# Patient Record
Sex: FEMALE | Race: WHITE | Employment: OTHER | URBAN - METROPOLITAN AREA
[De-identification: names, ages, dates, MRNs, and addresses within clinical notes are randomized per-mention and may not be internally consistent; named-entity substitution may affect disease eponyms.]

---
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
## 2018-04-06 PROBLEM — I10 ESSENTIAL HYPERTENSION, BENIGN: Status: ACTIVE | Noted: 2018-04-06

## 2018-04-06 PROBLEM — D47.3 PRIMARY THROMBOCYTOSIS (HCC): Status: ACTIVE | Noted: 2018-04-06

## 2018-04-06 PROBLEM — R53.81 DEBILITY: Status: ACTIVE | Noted: 2018-04-06

## 2018-04-06 PROBLEM — R29.898 WEAKNESS OF BOTH LOWER EXTREMITIES: Status: ACTIVE | Noted: 2018-04-06
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
## 2019-05-29 ENCOUNTER — APPOINTMENT (OUTPATIENT)
Dept: MRI IMAGING | Age: 84
DRG: 069 | End: 2019-05-29
Payer: MEDICARE

## 2019-05-29 ENCOUNTER — APPOINTMENT (OUTPATIENT)
Dept: CT IMAGING | Age: 84
DRG: 069 | End: 2019-05-29
Payer: MEDICARE

## 2019-05-29 ENCOUNTER — HOSPITAL ENCOUNTER (INPATIENT)
Age: 84
LOS: 3 days | Discharge: NURSING, REHAB OR LONG-TERM CARE | DRG: 069 | End: 2019-06-01
Attending: EMERGENCY MEDICINE | Admitting: INTERNAL MEDICINE
Payer: MEDICARE

## 2019-05-29 ENCOUNTER — APPOINTMENT (OUTPATIENT)
Dept: GENERAL RADIOLOGY | Age: 84
DRG: 069 | End: 2019-05-29
Payer: MEDICARE

## 2019-05-29 DIAGNOSIS — I63.9 CEREBROVASCULAR ACCIDENT (CVA), UNSPECIFIED MECHANISM (HCC): ICD-10-CM

## 2019-05-29 DIAGNOSIS — I67.1 ANTERIOR COMMUNICATING ARTERY ANEURYSM: ICD-10-CM

## 2019-05-29 DIAGNOSIS — I66.21 OCCLUSION AND STENOSIS OF RIGHT POSTERIOR CEREBRAL ARTERY: Primary | ICD-10-CM

## 2019-05-29 DIAGNOSIS — R91.1 PULMONARY NODULE: ICD-10-CM

## 2019-05-29 DIAGNOSIS — R29.898 WEAKNESS OF BOTH LOWER EXTREMITIES: ICD-10-CM

## 2019-05-29 PROBLEM — R79.89 AZOTEMIA: Status: ACTIVE | Noted: 2019-05-29

## 2019-05-29 PROBLEM — I69.320 APHASIA S/P CVA: Status: ACTIVE | Noted: 2019-05-29

## 2019-05-29 LAB
A/G RATIO: 1.4 (ref 1.1–2.2)
ALBUMIN SERPL-MCNC: 4.3 G/DL (ref 3.4–5)
ALP BLD-CCNC: 46 U/L (ref 40–129)
ALT SERPL-CCNC: 10 U/L (ref 10–40)
ANION GAP SERPL CALCULATED.3IONS-SCNC: 12 MMOL/L (ref 3–16)
ANISOCYTOSIS: ABNORMAL
AST SERPL-CCNC: 26 U/L (ref 15–37)
BASOPHILS ABSOLUTE: 0 K/UL (ref 0–0.2)
BASOPHILS RELATIVE PERCENT: 0.6 %
BILIRUB SERPL-MCNC: 0.5 MG/DL (ref 0–1)
BILIRUBIN URINE: NEGATIVE
BLOOD, URINE: NEGATIVE
BUN BLDV-MCNC: 22 MG/DL (ref 7–20)
CALCIUM SERPL-MCNC: 9.4 MG/DL (ref 8.3–10.6)
CHLORIDE BLD-SCNC: 99 MMOL/L (ref 99–110)
CLARITY: CLEAR
CO2: 24 MMOL/L (ref 21–32)
COLOR: NORMAL
CREAT SERPL-MCNC: 1.1 MG/DL (ref 0.6–1.2)
EOSINOPHILS ABSOLUTE: 0 K/UL (ref 0–0.6)
EOSINOPHILS RELATIVE PERCENT: 0.3 %
GFR AFRICAN AMERICAN: 56
GFR NON-AFRICAN AMERICAN: 46
GLOBULIN: 3 G/DL
GLUCOSE BLD-MCNC: 118 MG/DL (ref 70–99)
GLUCOSE BLD-MCNC: 121 MG/DL (ref 70–99)
GLUCOSE URINE: NEGATIVE MG/DL
HCT VFR BLD CALC: 34.6 % (ref 36–48)
HEMATOLOGY PATH CONSULT: YES
HEMOGLOBIN: 11.7 G/DL (ref 12–16)
INR BLD: 1.06 (ref 0.86–1.14)
KETONES, URINE: NEGATIVE MG/DL
LACTIC ACID: 2.1 MMOL/L (ref 0.4–2)
LEUKOCYTE ESTERASE, URINE: NEGATIVE
LYMPHOCYTES ABSOLUTE: 2.1 K/UL (ref 1–5.1)
LYMPHOCYTES RELATIVE PERCENT: 28.7 %
MACROCYTES: ABNORMAL
MCH RBC QN AUTO: 39.7 PG (ref 26–34)
MCHC RBC AUTO-ENTMCNC: 33.7 G/DL (ref 31–36)
MCV RBC AUTO: 117.7 FL (ref 80–100)
MICROSCOPIC EXAMINATION: NORMAL
MONOCYTES ABSOLUTE: 0.4 K/UL (ref 0–1.3)
MONOCYTES RELATIVE PERCENT: 5.9 %
NEUTROPHILS ABSOLUTE: 4.7 K/UL (ref 1.7–7.7)
NEUTROPHILS RELATIVE PERCENT: 64.5 %
NITRITE, URINE: NEGATIVE
PDW BLD-RTO: 13.3 % (ref 12.4–15.4)
PERFORMED ON: ABNORMAL
PH UA: 6 (ref 5–8)
PLATELET # BLD: 267 K/UL (ref 135–450)
PLATELET SLIDE REVIEW: ADEQUATE
PMV BLD AUTO: 6.5 FL (ref 5–10.5)
POTASSIUM SERPL-SCNC: 4.9 MMOL/L (ref 3.5–5.1)
PRO-BNP: 362 PG/ML (ref 0–449)
PROTEIN UA: NEGATIVE MG/DL
PROTHROMBIN TIME: 12.1 SEC (ref 9.8–13)
RBC # BLD: 2.94 M/UL (ref 4–5.2)
SLIDE REVIEW: ABNORMAL
SODIUM BLD-SCNC: 135 MMOL/L (ref 136–145)
SPECIFIC GRAVITY UA: <=1.005 (ref 1–1.03)
TOTAL PROTEIN: 7.3 G/DL (ref 6.4–8.2)
TROPONIN: <0.01 NG/ML
URINE REFLEX TO CULTURE: NORMAL
URINE TYPE: NORMAL
UROBILINOGEN, URINE: 0.2 E.U./DL
WBC # BLD: 7.3 K/UL (ref 4–11)

## 2019-05-29 PROCEDURE — 80053 COMPREHEN METABOLIC PANEL: CPT

## 2019-05-29 PROCEDURE — 83880 ASSAY OF NATRIURETIC PEPTIDE: CPT

## 2019-05-29 PROCEDURE — 85025 COMPLETE CBC W/AUTO DIFF WBC: CPT

## 2019-05-29 PROCEDURE — 84484 ASSAY OF TROPONIN QUANT: CPT

## 2019-05-29 PROCEDURE — 6360000004 HC RX CONTRAST MEDICATION: Performed by: EMERGENCY MEDICINE

## 2019-05-29 PROCEDURE — 1200000000 HC SEMI PRIVATE

## 2019-05-29 PROCEDURE — 70498 CT ANGIOGRAPHY NECK: CPT

## 2019-05-29 PROCEDURE — 70450 CT HEAD/BRAIN W/O DYE: CPT

## 2019-05-29 PROCEDURE — 2580000003 HC RX 258: Performed by: INTERNAL MEDICINE

## 2019-05-29 PROCEDURE — 6360000002 HC RX W HCPCS: Performed by: INTERNAL MEDICINE

## 2019-05-29 PROCEDURE — 70496 CT ANGIOGRAPHY HEAD: CPT

## 2019-05-29 PROCEDURE — 6360000002 HC RX W HCPCS

## 2019-05-29 PROCEDURE — 81003 URINALYSIS AUTO W/O SCOPE: CPT

## 2019-05-29 PROCEDURE — 85610 PROTHROMBIN TIME: CPT

## 2019-05-29 PROCEDURE — 71045 X-RAY EXAM CHEST 1 VIEW: CPT

## 2019-05-29 PROCEDURE — 93005 ELECTROCARDIOGRAM TRACING: CPT | Performed by: EMERGENCY MEDICINE

## 2019-05-29 PROCEDURE — 6370000000 HC RX 637 (ALT 250 FOR IP): Performed by: NURSE PRACTITIONER

## 2019-05-29 PROCEDURE — 83605 ASSAY OF LACTIC ACID: CPT

## 2019-05-29 PROCEDURE — 99291 CRITICAL CARE FIRST HOUR: CPT

## 2019-05-29 PROCEDURE — 96374 THER/PROPH/DIAG INJ IV PUSH: CPT

## 2019-05-29 PROCEDURE — 6370000000 HC RX 637 (ALT 250 FOR IP): Performed by: INTERNAL MEDICINE

## 2019-05-29 PROCEDURE — 36415 COLL VENOUS BLD VENIPUNCTURE: CPT

## 2019-05-29 PROCEDURE — 70551 MRI BRAIN STEM W/O DYE: CPT

## 2019-05-29 RX ORDER — ASPIRIN 81 MG/1
81 TABLET ORAL DAILY
Status: DISCONTINUED | OUTPATIENT
Start: 2019-05-29 | End: 2019-06-01 | Stop reason: HOSPADM

## 2019-05-29 RX ORDER — SODIUM CHLORIDE 9 MG/ML
INJECTION, SOLUTION INTRAVENOUS CONTINUOUS
Status: DISPENSED | OUTPATIENT
Start: 2019-05-29 | End: 2019-05-30

## 2019-05-29 RX ORDER — HYDROXYUREA 500 MG/1
500 CAPSULE ORAL DAILY
Status: DISCONTINUED | OUTPATIENT
Start: 2019-05-29 | End: 2019-06-01 | Stop reason: HOSPADM

## 2019-05-29 RX ORDER — ACETAMINOPHEN 325 MG/1
650 TABLET ORAL EVERY 4 HOURS PRN
Status: DISCONTINUED | OUTPATIENT
Start: 2019-05-29 | End: 2019-05-29 | Stop reason: SDUPTHER

## 2019-05-29 RX ORDER — MULTIVITAMIN
1 TABLET ORAL
COMMUNITY

## 2019-05-29 RX ORDER — SIMVASTATIN 10 MG
20 TABLET ORAL NIGHTLY
Status: DISCONTINUED | OUTPATIENT
Start: 2019-05-29 | End: 2019-06-01 | Stop reason: HOSPADM

## 2019-05-29 RX ORDER — ONDANSETRON 2 MG/ML
4 INJECTION INTRAMUSCULAR; INTRAVENOUS EVERY 6 HOURS PRN
Status: DISCONTINUED | OUTPATIENT
Start: 2019-05-29 | End: 2019-06-01 | Stop reason: HOSPADM

## 2019-05-29 RX ORDER — ACETAMINOPHEN 500 MG
500 TABLET ORAL EVERY 6 HOURS PRN
Status: DISCONTINUED | OUTPATIENT
Start: 2019-05-29 | End: 2019-06-01 | Stop reason: HOSPADM

## 2019-05-29 RX ORDER — CHOLECALCIFEROL (VITAMIN D3) 125 MCG
5 CAPSULE ORAL NIGHTLY PRN
Status: DISCONTINUED | OUTPATIENT
Start: 2019-05-29 | End: 2019-06-01 | Stop reason: HOSPADM

## 2019-05-29 RX ORDER — METOPROLOL SUCCINATE 25 MG/1
25 TABLET, EXTENDED RELEASE ORAL DAILY
Status: DISCONTINUED | OUTPATIENT
Start: 2019-05-29 | End: 2019-06-01 | Stop reason: HOSPADM

## 2019-05-29 RX ORDER — ACETAMINOPHEN 500 MG
500 TABLET ORAL
COMMUNITY

## 2019-05-29 RX ORDER — LORAZEPAM 2 MG/ML
INJECTION INTRAMUSCULAR
Status: COMPLETED
Start: 2019-05-29 | End: 2019-05-29

## 2019-05-29 RX ORDER — DOXYCYCLINE HYCLATE 100 MG
100 TABLET ORAL 2 TIMES DAILY
Status: DISCONTINUED | OUTPATIENT
Start: 2019-05-29 | End: 2019-06-01 | Stop reason: HOSPADM

## 2019-05-29 RX ORDER — HYDROCODONE BITARTRATE AND ACETAMINOPHEN 5; 325 MG/1; MG/1
1 TABLET ORAL DAILY
Status: DISCONTINUED | OUTPATIENT
Start: 2019-05-29 | End: 2019-06-01 | Stop reason: HOSPADM

## 2019-05-29 RX ORDER — SODIUM CHLORIDE 0.9 % (FLUSH) 0.9 %
10 SYRINGE (ML) INJECTION EVERY 12 HOURS SCHEDULED
Status: DISCONTINUED | OUTPATIENT
Start: 2019-05-29 | End: 2019-06-01 | Stop reason: HOSPADM

## 2019-05-29 RX ORDER — AMLODIPINE BESYLATE 2.5 MG/1
2.5 TABLET ORAL NIGHTLY
Status: DISCONTINUED | OUTPATIENT
Start: 2019-05-29 | End: 2019-06-01 | Stop reason: HOSPADM

## 2019-05-29 RX ORDER — GABAPENTIN 100 MG/1
100 CAPSULE ORAL 2 TIMES DAILY
Status: DISCONTINUED | OUTPATIENT
Start: 2019-05-29 | End: 2019-06-01 | Stop reason: HOSPADM

## 2019-05-29 RX ORDER — GABAPENTIN 100 MG/1
CAPSULE ORAL
Status: ON HOLD | COMMUNITY
Start: 2019-03-26 | End: 2021-01-08 | Stop reason: HOSPADM

## 2019-05-29 RX ORDER — HYDROXYUREA 500 MG/1
CAPSULE ORAL
COMMUNITY
Start: 2019-05-17

## 2019-05-29 RX ORDER — PROPAFENONE HYDROCHLORIDE 225 MG/1
225 CAPSULE, EXTENDED RELEASE ORAL EVERY 8 HOURS
Status: DISCONTINUED | OUTPATIENT
Start: 2019-05-29 | End: 2019-06-01 | Stop reason: HOSPADM

## 2019-05-29 RX ORDER — DORZOLAMIDE HCL 20 MG/ML
1 SOLUTION/ DROPS OPHTHALMIC 3 TIMES DAILY
Status: DISCONTINUED | OUTPATIENT
Start: 2019-05-29 | End: 2019-06-01 | Stop reason: HOSPADM

## 2019-05-29 RX ORDER — SODIUM CHLORIDE 0.9 % (FLUSH) 0.9 %
10 SYRINGE (ML) INJECTION PRN
Status: DISCONTINUED | OUTPATIENT
Start: 2019-05-29 | End: 2019-06-01 | Stop reason: HOSPADM

## 2019-05-29 RX ORDER — LORAZEPAM 2 MG/ML
1 INJECTION INTRAMUSCULAR ONCE
Status: COMPLETED | OUTPATIENT
Start: 2019-05-29 | End: 2019-05-29

## 2019-05-29 RX ORDER — DORZOLAMIDE HCL 20 MG/ML
SOLUTION/ DROPS OPHTHALMIC
COMMUNITY

## 2019-05-29 RX ORDER — DOXYCYCLINE HYCLATE 100 MG
TABLET ORAL
Status: ON HOLD | COMMUNITY
Start: 2019-05-22 | End: 2019-06-01 | Stop reason: HOSPADM

## 2019-05-29 RX ADMIN — LORAZEPAM 1 MG: 2 INJECTION INTRAMUSCULAR at 12:32

## 2019-05-29 RX ADMIN — ASPIRIN 81 MG: 81 TABLET, COATED ORAL at 16:40

## 2019-05-29 RX ADMIN — IOPAMIDOL 75 ML: 755 INJECTION, SOLUTION INTRAVENOUS at 12:51

## 2019-05-29 RX ADMIN — SIMVASTATIN 20 MG: 10 TABLET, FILM COATED ORAL at 22:03

## 2019-05-29 RX ADMIN — MELATONIN TAB 5 MG 5 MG: 5 TAB at 22:03

## 2019-05-29 RX ADMIN — ENOXAPARIN SODIUM 30 MG: 30 INJECTION SUBCUTANEOUS at 16:39

## 2019-05-29 RX ADMIN — LORAZEPAM 1 MG: 2 INJECTION INTRAMUSCULAR; INTRAVENOUS at 12:32

## 2019-05-29 RX ADMIN — SODIUM CHLORIDE: 9 INJECTION, SOLUTION INTRAVENOUS at 16:40

## 2019-05-29 ASSESSMENT — PAIN SCALES - GENERAL: PAINLEVEL_OUTOF10: 0

## 2019-05-29 NOTE — ED NOTES
Pt arrival 1229  ED  assess & ordered  stroke team @0399  CT notified of ED CODE STROKE 1230   stroke DrErenUNKNOWN (Watchung did not answer call, Joao cannot remember who he spoke to)         Jaclyn German  05/29/19 [id number]

## 2019-05-29 NOTE — ED NOTES
Bed: 01  Expected date: 5/29/19  Expected time: 12:16 PM  Means of arrival: [facility]  Comments:  81yo female AMS, general weakness, , \"trouble getting words out\" per squad report NIHSS negative      Sylwia Payne, KENDRICK  05/29/19 [id number]

## 2019-05-29 NOTE — ED PROVIDER NOTES
[facility] Emergency Department    CHIEF COMPLAINT  Speech Problem (Pt having trouble \"getting words out\" per EMS; unknown LKW; tremors of arms. Dr. Andrea Dies to triage for assessment upon pt arrival )      HISTORY OF PRESENT ILLNESS  Ruel Arizmendi is a 80 y.o. female presenting to the ER with some difficulty speaking. Much of the history was obtained initially from the paramedics and also from the patient and further additional information was obtained from the daughter who arrived later. Patient lives a lone. She was last seen by family may be around 8 PM last night and seemed to be acting normally. The daughter was unable to get a hold of her mother by phone this morning and went over to the patient's home and found her sitting in a chair staring blankly ahead. The patient was having difficulty getting words out. On arrival, patient is having some difficulty getting words out and stuttering. Nothing in particular seems to make symptoms better or worse. No other complaints, modifying factors or associated symptoms. I have reviewed the following from the nursing documentation.     Past Medical History:   Diagnosis Date    Atrial fibrillation ([address]     Colon polyps 5/05    Depression     Diabetes mellitus ([address]     Diverticula of colon 5/05    Elbow injury 12/10    fell -injured lt elbow    Esophageal reflux     Glaucoma     Hearing loss     hard of hearing - bilateral hearing aides    Hyperlipidemia     Insomnia     Osteoporosis     PAT (paroxysmal atrial tachycardia) (HCC)     Rhinitis, allergic     Wears glasses      Past Surgical History:   Procedure Laterality Date    APPENDECTOMY      CATARACT REMOVAL WITH IMPLANT  1/17/11    right eye    CATARACT REMOVAL WITH IMPLANT  t    cataract removal with lens implant left eye    CHOLECYSTECTOMY      COLONOSCOPY      HYSTERECTOMY      TONSILLECTOMY      age 15     Family History   Problem Relation Age of Onset    Stroke Father     Diabetes Brother     Diabetes Maternal Aunt      Social History     Socioeconomic History    Marital status:       Spouse name: Not on file    Number of children: Not on file    Years of education: Not on file    Highest education level: Not on file   Occupational History    Not on file   Social Needs    Financial resource strain: Not on file    Food insecurity:     Worry: Not on file     Inability: Not on file    Transportation needs:     Medical: Not on file     Non-medical: Not on file   Tobacco Use    Smoking status: Former Smoker     Last attempt to quit: 1975     Years since quittin.4    Smokeless tobacco: Never Used   Substance and Sexual Activity    Alcohol use: Yes     Comment: occasionally    Drug use: No    Sexual activity: Not on file   Lifestyle    Physical activity:     Days per week: Not on file     Minutes per session: Not on file    Stress: Not on file   Relationships    Social connections:     Talks on phone: Not on file     Gets together: Not on file     Attends [employer] service: Not on file     Active member of club or organization: Not on file     Attends meetings of clubs or organizations: Not on file     Relationship status: Not on file    Intimate partner violence:     Fear of current or ex partner: Not on file     Emotionally abused: Not on file     Physically abused: Not on file     Forced sexual activity: Not on file   Other Topics Concern    Not on file   Social History Narrative    Not on file     Current Facility-Administered Medications   Medication Dose Route Frequency Provider Last Rate Last Dose    sodium chloride flush 0.9 % injection 10 mL  10 mL Intravenous 2 times per day Moisés Malik MD        sodium chloride flush 0.9 % injection 10 mL  10 mL Intravenous PRN Moisés Malik MD        magnesium hydroxide (MILK OF MAGNESIA) 400 MG/5ML suspension 30 mL  30 mL Oral Daily PRN Moisés Malik MD        ondansetron [facility] injection 4 mg  4 mg Intravenous Q6H PRN Syd Bowser MD        enoxaparin (LOVENOX) injection 30 mg  30 mg Subcutaneous Daily Syd Bowser MD        acetaminophen (TYLENOL) tablet 500 mg  500 mg Oral Q6H PRN Syd Bowser MD        amLODIPine (NORVASC) tablet 2.5 mg  2.5 mg Oral Nightly Syd Bowser MD        aspirin EC tablet 81 mg  81 mg Oral Daily Syd Bowser MD        dorzolamide (TRUSOPT) 2 % ophthalmic solution 1 drop  1 drop Both Eyes TID Syd Bowser MD        doxycycline hyclate (VIBRA-TABS) tablet 100 mg  100 mg Oral BID Syd Bowser MD        gabapentin (NEURONTIN) capsule 100 mg  100 mg Oral BID Syd Bowser MD        HYDROcodone-acetaminophen (NORCO) 5-325 MG per tablet 1 tablet  1 tablet Oral Daily Syd Bowser MD        hydroxyurea (HYDREA) chemo capsule 500 mg  500 mg Oral Daily Syd Bowser MD        metoprolol succinate (TOPROL XL) extended release tablet 25 mg  25 mg Oral Daily Syd Bowser MD        propafenone [facility]) extended release capsule 225 mg  225 mg Oral Q8H Syd Bowser MD        simvastatin (ZOCOR) tablet 20 mg  20 mg Oral Nightly Syd Bowser MD        0.9 % sodium chloride infusion   Intravenous Continuous Syd Bowser MD         Allergies   Allergen Reactions    Actonel [Risedronate Sodium] Other (See Comments)     actonel causes GI upset     Amoxicillin Hives       REVIEW OF SYSTEMS  10 systems reviewed, pertinent positives per HPI otherwise noted to be negative. PHYSICAL EXAM  /60   Pulse 60   Temp 98 °F (36.7 °C) (Oral)   Resp 16   Ht 5' 5\" (1.651 m)   Wt 120 lb 3.2 oz (54.5 kg)   SpO2 98%   BMI 20.00 kg/m²   GENERAL APPEARANCE: Awake and alert. Cooperative  HEAD: Normocephalic. Atraumatic. EYES: PERRL. EOM's grossly intact. ENT: Mucous membranes are moist.   NECK: Supple. Non-tender  HEART: RRR. LUNGS: Respirations unlabored. CTAB. Good air exchange. Speaking comfortably in full sentences. ABDOMEN: Soft. Non-distended. Non-tender. No masses. No organomegaly. No guarding or rebound. BACK:  No midline Tenderness. EXTREMITIES: No peripheral edema. Moves all extremities equally. All extremities neurovascularly intact. SKIN: Warm and dry. No acute rashes. NEUROLOGICAL: Alert and oriented. CN's 2-12 intact. No gross facial drooping. Strength 5/5, sensation intact. 2 plus DTR's in lower extremity bilaterally. Gait normal.   PSYCHIATRIC: Normal mood and affect. LABS  I have reviewed all labs for this visit.    Results for orders placed or performed during the hospital encounter of 05/29/19   CBC Auto Differential   Result Value Ref Range    WBC 7.3 4.0 - 11.0 K/uL    RBC 2.94 (L) 4.00 - 5.20 M/uL    Hemoglobin 11.7 (L) 12.0 - 16.0 g/dL    Hematocrit 34.6 (L) 36.0 - 48.0 %    .7 (H) 80.0 - 100.0 fL    MCH 39.7 (H) 26.0 - 34.0 pg    MCHC 33.7 31.0 - 36.0 g/dL    RDW 13.3 12.4 - 15.4 %    Platelets 938 639 - 555 K/uL    MPV 6.5 5.0 - 10.5 fL    PLATELET SLIDE REVIEW Adequate     SLIDE REVIEW see below     Path Consult Yes     Neutrophils % 64.5 %    Lymphocytes % 28.7 %    Monocytes % 5.9 %    Eosinophils % 0.3 %    Basophils % 0.6 %    Neutrophils # 4.7 1.7 - 7.7 K/uL    Lymphocytes # 2.1 1.0 - 5.1 K/uL    Monocytes # 0.4 0.0 - 1.3 K/uL    Eosinophils # 0.0 0.0 - 0.6 K/uL    Basophils # 0.0 0.0 - 0.2 K/uL    Anisocytosis 3+ (A)     Macrocytes 3+ (A)    Comprehensive Metabolic Panel   Result Value Ref Range    Sodium 135 (L) 136 - 145 mmol/L    Potassium 4.9 3.5 - 5.1 mmol/L    Chloride 99 99 - 110 mmol/L    CO2 24 21 - 32 mmol/L    Anion Gap 12 3 - 16    Glucose 121 (H) 70 - 99 mg/dL    BUN 22 (H) 7 - 20 mg/dL    CREATININE 1.1 0.6 - 1.2 mg/dL    GFR Non- 46 (A) >60    GFR  56 (A) >60    Calcium 9.4 8.3 - 10.6 mg/dL    Total Protein 7.3 6.4 - 8.2 g/dL    Alb 4.3 3.4 - 5.0 g/dL    Albumin/Globulin Ratio 1.4 1.1 - 2.2    Total Bilirubin 0.5 0.0 - 1.0 mg/dL disease involving the bilateral carotid bifurcation. No flow-limiting stenosis by NASCET criteria. No dissection or arterial injury is seen. VERTEBRAL ARTERIES: Scattered atherosclerotic calcification. Otherwise, the vertebral arteries both arise from the subclavian arteries and are normal in caliber without evidence of flow limiting stenosis or dissection. SOFT TISSUES: There is a 7 mm nodular pulmonary opacity within the right lung apex. This may represent a pulmonary nodule or area of scarring. No acute abnormality within the upper chest. There are multiple thyroid nodules bilaterally, with the largest thyroid nodule measuring 18 mm on the left. Please see recommendations below. The nasopharynx, oropharynx, hypopharynx, and laryngeal structures are unremarkable. The parotid glands and submandibular glands are unremarkable. No evidence of significant cervical or supraclavicular lymphadenopathy. BONES: Multilevel degenerative changes of the cervical spine, with multiple levels of grade 1 anterolisthesis. These are likely degenerative. Moderate compression deformity of T6 and T7 vertebral bodies. This appears chronic. Correlate with recent history of trauma. CTA HEAD: ANTERIOR CIRCULATION: Atherosclerotic disease is present at the bilateral internal carotid arteries intracranially. No flow-limiting stenosis. The anterior cerebral and middle cerebral arteries demonstrate no focal stenosis. There is a 2 mm aneurysm arising from the junction of the right A1 and A2 segments projecting posteromedially. POSTERIOR CIRCULATION: Moderate stenosis at the junction of the P1 and P2 segments of the right posterior cerebral artery. The left posterior cerebral artery appears unremarkable. The basilar artery and distal vertebral arteries are unremarkable. No high-grade stenosis or focal occlusion involving the intracranial or cervical vasculature. No evidence of acute dissection.  Moderate stenosis of the right posterior cerebral artery at the junction of P1 and P2 segments of the right posterior cerebral artery. Anterior communicating artery aneurysm arising from the junction of the right A1 and A2 segments measuring 2 mm. No subarachnoid hemorrhage was identified on CT examination. Chronic appearing moderate compression deformity of T6 and T7 vertebral bodies. Correlate with recent history of trauma and concern for traumatic findings. Nonspecific 8 mm pulmonary opacity within the right lung apex. Although this may represent area of scarring, pulmonary nodule is not excluded. Follow-up chest CT may be obtained as clinically warranted in this age group. Multiple thyroid nodules measuring 17 mm. Please see recommendations below. RECOMMENDATIONS: Managing Incidental Thyroid Nodule Detected at CT or MRI or US 1. Further evaluation by thyroid Ultrasound recommended for these incidental nodules: Patient Age 25 years or less - Nodule of any size Patient Age 21-27 years old - Nodule 1 cm in size or greater Patient Age 28 years or more - Nodule 1.5 cm in size or greater 2. Follow up thyroid ultrasound also recommend in these scenarios - Solitary nodule with high risk imaging features (locally invasive nodule or suspicious lymph nodes) - Heterogeneous, enlarged thyroid gland. - Increased uptake on PET 3. No further imaging is recommended in the following scenarios - Any nodule not meeting above criteria. - Those patients with limited life expectancy or significant Co-morbidities. Note: These recommendations do not apply to pts. w/ increased risk for thyroid cancer or pts. with symptomatic thyroid disease. ________________________________________________________________ Recommendations for f/u of Incidental Thyroid Nodules (ITN) found on CT, MR, NM and Extrathyroidal US are based upon the ACR white paper and [facility] 3-tiered system for managing ITNs: J Am Liliana Radiol.  2015 Feb;12(2): 143-50       ED COURSE/MDM  Patient seen and evaluated. Old records reviewed. Labs and imaging reviewed and results discussed with patient. This is a 70-year-old female presenting with aphasia and dysarthria. Patient has no other neurological findings on my examination. She was last seen normal around 8 PM last night. We do not have an exact time of onset. Patient lives alone. She is not a great historian. I did call a code stroke in I did speak with the stroke physician and he is not recommending TPA at this time. Patient was given an aspirin and admitted for further management. She has been stable throughout emergency department stay without any worsening of symptoms. Her CTA does show stenosis of the right posterior cerebral artery and also what is likely an incidental anterior communicating artery aneurysm. Due to the immediate potential for life-threatening deterioration due too stroke workup which did fine stenosis of posterior cerebral artery and anterior communicating artery aneurysm, I spent 65 minutes providing critical care. This time is excluding time spent performing procedures. CLINICAL IMPRESSION  1. 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 Cerebrovascular accident (CVA), unspecified mechanism ([address]    3. Pulmonary nodule    4. Anterior communicating artery aneurysm        Blood pressure 120/60, pulse 60, temperature 98 °F (36.7 °C), temperature source Oral, resp. rate 16, height 5' 5\" (1.651 m), weight 120 lb 3.2 oz (54.5 kg), SpO2 98 %. Elodia Morales was admitted in fair condition.          Hayder Crow DO  05/29/19 0405

## 2019-05-29 NOTE — H&P
Hospital Medicine History & Physical      PCP: Sha Bustillos MD    Date of Admission: 5/29/2019    Date of Service: Pt seen/examined on 30 May and Admitted to Inpatient with expected LOS greater than two midnights due to medical therapy. Chief Complaint:  Aphasia      History Of Present Illness:     80 y.o. female w/ hx Afib not on anticoagulation other than ASA due to fall risk who presented to [facility] with an episode of what seems like expressive aphasia and mental status changes just PTA 29 May, last seen normal PM 28 May. She denied any other antecedent sxs or other focal neuro deficits. The patient denies any fever/chills or other signs/sxs of systemic illness or identifiable aggravating/alleviating factors. She has maintained her appetite and denies urinary c/o. Per daughter present at bedside during eval she is near baseline. Past Medical History:          Diagnosis Date    Atrial fibrillation ([address]     Colon polyps 5/05    Depression     Diabetes mellitus ([facility] [address]     Diverticula of colon 5/05    Elbow injury 12/10    fell -injured lt elbow    Esophageal reflux     Glaucoma     Hearing loss     hard of hearing - bilateral hearing aides    Hyperlipidemia     Insomnia     Osteoporosis     PAT (paroxysmal atrial tachycardia) (HCC)     Rhinitis, allergic     Wears glasses        Past Surgical History:          Procedure Laterality Date    APPENDECTOMY      CATARACT REMOVAL WITH IMPLANT  1/17/11    right eye    CATARACT REMOVAL WITH IMPLANT  t    cataract removal with lens implant left eye    CHOLECYSTECTOMY      COLONOSCOPY      HYSTERECTOMY      TONSILLECTOMY      age 15       Medications Prior to Admission:      Prior to Admission medications    Medication Sig Start Date End Date Taking?  Authorizing Provider   dorzolamide (TRUSOPT) 2 % ophthalmic solution Apply to eye   Yes Historical Provider, MD   acetaminophen (TYLENOL) 500 MG tablet Take 500 mg by mouth   Yes Historical Provider, MD   doxycycline hyclate (VIBRA-TABS) 100 MG tablet  5/22/19  Yes Historical Provider, MD   gabapentin (NEURONTIN) 100 MG capsule  3/26/19  Yes Historical Provider, MD   hydroxyurea (HYDREA) 500 MG chemo capsule  5/17/19  Yes Historical Provider, MD   Multiple Vitamin (MULTIVITAMIN) tablet Take 1 tablet by mouth   Yes Historical Provider, MD   simvastatin (ZOCOR) 20 MG tablet Take 1 tablet by mouth nightly 4/13/18  Yes Sharon Hong MD   metoprolol succinate (TOPROL XL) 25 MG extended release tablet Take 1 tablet by mouth daily 4/14/18  Yes Sharon Hong MD   amLODIPine (NORVASC) 2.5 MG tablet Take 1 tablet by mouth nightly 4/13/18  Yes Blake Foreman MD   propafenone (RYTHMOL SR) 225 MG extended release capsule Take 1 capsule by mouth every 8 hours 4/13/18  Yes Blake Foreman MD   HYDROcodone-acetaminophen [facility]) 5-325 MG per tablet Take 1 tablet by mouth daily 1/6/16  Yes ASHLEY Cordoba MD   saline nasal gel (AYR) GEL by Nasal route as needed for Congestion   Yes Historical Provider, MD   timolol (TIMOPTIC-XE) 0.5 % ophthalmic gel-forming Place 1 drop into both eyes daily   Yes Historical Provider, MD   aspirin 81 MG EC tablet Take 81 mg by mouth daily. Yes Historical Provider, MD   gabapentin (NEURONTIN) 100 MG capsule Take 1 capsule by mouth 3 times daily for 122 days. . 4/13/18 8/13/18  Sharon Hong MD       Allergies:  Actonel [risedronate sodium] and Amoxicillin    Social History:      The patient currently lives independently, w/ family help and ambulatory w/ walker    TOBACCO:   reports that she quit smoking about 44 years ago. She has never used smokeless tobacco.  ETOH:   reports that she drinks alcohol. Family History:      Reviewed in detail and negative for CAD, Cancer.  Positive as follows:        Problem Relation Age of Onset    Stroke Father     Diabetes Brother     Diabetes Maternal Aunt        REVIEW OF SYSTEMS:   Pertinent positives as 04/05/2018    SPECGRAV <=1.005 04/05/2018    GLUCOSEU Negative 04/05/2018         ASSESSMENT:    Active Hospital Problems    Diagnosis Date Noted    Azotemia [R79.89] 05/29/2019    Aphasia S/P CVA [I69.320] 05/29/2019    Essential hypertension, benign [I10] 04/06/2018    Atrial fibrillation ([facility] [address] [I48.91] 09/12/2011       PLAN:      CVA - possible, w/ aphasia. CT head negative for acute process. Stroke team called from ED w/out recs for tPA. MRI w/ severe chronic small vessel changes but no evidence of acute infarct. ASA ordered. Neurology consulted and appreciated in advance. Neuro checks and lipid panel for AM    HTN - w/out known CAD and no evidence of active signs/sxs of ischemia/failure. Currently controlled on home meds w/ vitals reviewed and documented as above. Afib - rate controlled on Rythmol/BBlocker - continued. Not currently anticoagulated. Azotemia - w/ elevated BUN/Cr ratio c/w pre-renal azotemia. Given gentle IVF hydration and follow serial labs - stable. Reviewed and documented as above. DVT Prophylaxis: LMWH  Diet: No diet orders on file  Code Status: Prior      PT/OT Eval Status: ordered and pending. Dispo - pending Neurology recs and PT/OT eval.  Likely to home Thurs/Friday 30/31 May. Kenji Medellin MD    Thank you Marycruz Good MD for the opportunity to be involved in this patient's care. If you have any questions or concerns please feel free to contact me at [phone].

## 2019-05-30 LAB
ALBUMIN SERPL-MCNC: 3.8 G/DL (ref 3.4–5)
ANION GAP SERPL CALCULATED.3IONS-SCNC: 9 MMOL/L (ref 3–16)
BUN BLDV-MCNC: 24 MG/DL (ref 7–20)
CALCIUM SERPL-MCNC: 9.2 MG/DL (ref 8.3–10.6)
CHLORIDE BLD-SCNC: 107 MMOL/L (ref 99–110)
CHOLESTEROL, TOTAL: 134 MG/DL (ref 0–199)
CO2: 24 MMOL/L (ref 21–32)
CREAT SERPL-MCNC: 1.1 MG/DL (ref 0.6–1.2)
EKG ATRIAL RATE: 61 BPM
EKG DIAGNOSIS: NORMAL
EKG P AXIS: 84 DEGREES
EKG P-R INTERVAL: 284 MS
EKG Q-T INTERVAL: 500 MS
EKG QRS DURATION: 148 MS
EKG QTC CALCULATION (BAZETT): 503 MS
EKG R AXIS: -21 DEGREES
EKG T AXIS: 31 DEGREES
EKG VENTRICULAR RATE: 61 BPM
GFR AFRICAN AMERICAN: 56
GFR NON-AFRICAN AMERICAN: 46
GLUCOSE BLD-MCNC: 87 MG/DL (ref 70–99)
HCT VFR BLD CALC: 31.8 % (ref 36–48)
HDLC SERPL-MCNC: 71 MG/DL (ref 40–60)
HEMATOLOGY PATH CONSULT: NO
HEMOGLOBIN: 10.8 G/DL (ref 12–16)
LDL CHOLESTEROL CALCULATED: 52 MG/DL
MCH RBC QN AUTO: 40.1 PG (ref 26–34)
MCHC RBC AUTO-ENTMCNC: 34.1 G/DL (ref 31–36)
MCV RBC AUTO: 117.5 FL (ref 80–100)
PDW BLD-RTO: 13.2 % (ref 12.4–15.4)
PHOSPHORUS: 3.9 MG/DL (ref 2.5–4.9)
PLATELET # BLD: 244 K/UL (ref 135–450)
PMV BLD AUTO: 7.1 FL (ref 5–10.5)
POTASSIUM SERPL-SCNC: 4.6 MMOL/L (ref 3.5–5.1)
RBC # BLD: 2.7 M/UL (ref 4–5.2)
SODIUM BLD-SCNC: 140 MMOL/L (ref 136–145)
TRIGL SERPL-MCNC: 56 MG/DL (ref 0–150)
VLDLC SERPL CALC-MCNC: 11 MG/DL
WBC # BLD: 6 K/UL (ref 4–11)

## 2019-05-30 PROCEDURE — 97166 OT EVAL MOD COMPLEX 45 MIN: CPT

## 2019-05-30 PROCEDURE — 97161 PT EVAL LOW COMPLEX 20 MIN: CPT

## 2019-05-30 PROCEDURE — 6370000000 HC RX 637 (ALT 250 FOR IP): Performed by: INTERNAL MEDICINE

## 2019-05-30 PROCEDURE — 6360000002 HC RX W HCPCS: Performed by: INTERNAL MEDICINE

## 2019-05-30 PROCEDURE — 36415 COLL VENOUS BLD VENIPUNCTURE: CPT

## 2019-05-30 PROCEDURE — 6370000000 HC RX 637 (ALT 250 FOR IP): Performed by: NURSE PRACTITIONER

## 2019-05-30 PROCEDURE — 80061 LIPID PANEL: CPT

## 2019-05-30 PROCEDURE — 97530 THERAPEUTIC ACTIVITIES: CPT

## 2019-05-30 PROCEDURE — 80069 RENAL FUNCTION PANEL: CPT

## 2019-05-30 PROCEDURE — 99223 1ST HOSP IP/OBS HIGH 75: CPT | Performed by: PSYCHIATRY & NEUROLOGY

## 2019-05-30 PROCEDURE — 2580000003 HC RX 258: Performed by: INTERNAL MEDICINE

## 2019-05-30 PROCEDURE — 97535 SELF CARE MNGMENT TRAINING: CPT

## 2019-05-30 PROCEDURE — 1200000000 HC SEMI PRIVATE

## 2019-05-30 PROCEDURE — 93010 ELECTROCARDIOGRAM REPORT: CPT | Performed by: INTERNAL MEDICINE

## 2019-05-30 PROCEDURE — 97116 GAIT TRAINING THERAPY: CPT

## 2019-05-30 PROCEDURE — 85027 COMPLETE CBC AUTOMATED: CPT

## 2019-05-30 RX ORDER — GABAPENTIN 100 MG/1
100 CAPSULE ORAL ONCE
Status: COMPLETED | OUTPATIENT
Start: 2019-05-30 | End: 2019-05-30

## 2019-05-30 RX ORDER — GUAIFENESIN 600 MG/1
600 TABLET, EXTENDED RELEASE ORAL 2 TIMES DAILY
Status: DISCONTINUED | OUTPATIENT
Start: 2019-05-30 | End: 2019-06-01

## 2019-05-30 RX ORDER — AMLODIPINE BESYLATE 2.5 MG/1
2.5 TABLET ORAL ONCE
Status: COMPLETED | OUTPATIENT
Start: 2019-05-30 | End: 2019-05-30

## 2019-05-30 RX ADMIN — HYDROCODONE BITARTRATE AND ACETAMINOPHEN 1 TABLET: 5; 325 TABLET ORAL at 09:48

## 2019-05-30 RX ADMIN — MELATONIN TAB 5 MG 5 MG: 5 TAB at 21:00

## 2019-05-30 RX ADMIN — ENOXAPARIN SODIUM 30 MG: 30 INJECTION SUBCUTANEOUS at 09:48

## 2019-05-30 RX ADMIN — PROPAFENONE HYDROCHLORIDE 225 MG: 225 CAPSULE, EXTENDED RELEASE ORAL at 23:42

## 2019-05-30 RX ADMIN — DORZOLAMIDE HYDROCHLORIDE 1 DROP: 20 SOLUTION/ DROPS OPHTHALMIC at 21:52

## 2019-05-30 RX ADMIN — PROPAFENONE HYDROCHLORIDE 225 MG: 225 CAPSULE, EXTENDED RELEASE ORAL at 16:30

## 2019-05-30 RX ADMIN — GABAPENTIN 100 MG: 100 CAPSULE ORAL at 21:00

## 2019-05-30 RX ADMIN — PROPAFENONE HYDROCHLORIDE 225 MG: 225 CAPSULE, EXTENDED RELEASE ORAL at 00:38

## 2019-05-30 RX ADMIN — SODIUM CHLORIDE: 9 INJECTION, SOLUTION INTRAVENOUS at 06:49

## 2019-05-30 RX ADMIN — Medication 10 ML: at 21:00

## 2019-05-30 RX ADMIN — GABAPENTIN 100 MG: 100 CAPSULE ORAL at 10:44

## 2019-05-30 RX ADMIN — AMLODIPINE BESYLATE 2.5 MG: 2.5 TABLET ORAL at 21:00

## 2019-05-30 RX ADMIN — METOPROLOL SUCCINATE 25 MG: 25 TABLET, EXTENDED RELEASE ORAL at 09:48

## 2019-05-30 RX ADMIN — SIMVASTATIN 20 MG: 10 TABLET, FILM COATED ORAL at 21:00

## 2019-05-30 RX ADMIN — GUAIFENESIN 600 MG: 600 TABLET, EXTENDED RELEASE ORAL at 21:00

## 2019-05-30 RX ADMIN — PROPAFENONE HYDROCHLORIDE 225 MG: 225 CAPSULE, EXTENDED RELEASE ORAL at 09:47

## 2019-05-30 RX ADMIN — GUAIFENESIN 600 MG: 600 TABLET, EXTENDED RELEASE ORAL at 10:57

## 2019-05-30 RX ADMIN — HYDROXYUREA 500 MG: 500 CAPSULE ORAL at 09:47

## 2019-05-30 RX ADMIN — ASPIRIN 81 MG: 81 TABLET, COATED ORAL at 09:48

## 2019-05-30 ASSESSMENT — PAIN SCALES - GENERAL
PAINLEVEL_OUTOF10: 0
PAINLEVEL_OUTOF10: 5
PAINLEVEL_OUTOF10: 0

## 2019-05-30 NOTE — PROGRESS NOTES
Occupational Therapy   Occupational Therapy Initial Assessment/treatment   Date: 2019   Patient Name: Mo Miramontes  MRN: [id number]     : 1924    Date of Service: 2019    Discharge Recommendations:  Home with Home health OT, Home with assist PRN  OT Equipment Recommendations  Equipment Needed: No    Assessment      Pt presents requires cues for safety with functional transfers, mobility, and ADL tasks d/t decreased insight with   deficits and slight impairments to her vision. Mod cues for sequencing and navigating within her environment - total assist to manage IV line. Recommend HHOT at d/c. Cont with OT POC. 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 Diagnoses of Cerebrovascular accident (CVA), unspecified mechanism ([address] Pulmonary nodule, and Anterior communicating artery aneurysm were also pertinent to this visit. has a past medical history of Atrial fibrillation ([address] Colon polyps, Depression, Diabetes mellitus ([address] Diverticula of colon, Elbow injury, Esophageal reflux, Glaucoma, Hearing loss, Hyperlipidemia, Insomnia, Osteoporosis, PAT (paroxysmal atrial tachycardia) ([address] Rhinitis, allergic, and Wears glasses. has a past surgical history that includes Cholecystectomy; Colonoscopy; Hysterectomy; Tonsillectomy; Appendectomy; Cataract removal with implant (11); and Cataract removal with implant (t). Restrictions  Restrictions/Precautions  Restrictions/Precautions: General Precautions, Up as Tolerated, Fall Risk    Subjective   General  Chart Reviewed: Yes  Patient assessed for rehabilitation services?: Yes  Referring Practitioner: Jayna Zamudio MD  Diagnosis: aphasia s/p CVA  General Comment  Comments: RN approved therapy   Oxygen Therapy  SpO2: 98 %  O2 Device: None (Room air)  Social/Functional History  Social/Functional History  Lives With: Alone(daughter lives closeby; nephew lives down the street - could stay with her if needed)  Type of Home: (condo)  Home Layout: One level  Home Access: Level entry  Bathroom Shower/Tub: Walk-in shower  Bathroom Toilet: Standard  Bathroom Equipment: Shower chair, Grab bars [facility] [address]: [facility] bed(transport chair)  Receives Help From: Family  ADL Assistance: Needs assistance(aides supervise with bathing, assist with laundry and housekeeping)  [address]: Needs assistance(daughter does the grocery shopping; meals-on-wheels)  Ambulation Assistance: Independent(using 1CT)  Transfer Assistance: Independent  Active : No(daughter will transport )  Occupation: Retired  Leisure & Hobbies: use to [employer]; watch tv        Objective   Vision: Impaired  Vision Exceptions: Wears glasses at all times  Hearing: Exceptions to [facility]  Hearing Exceptions: Bilateral hearing aid;Hard of hearing/hearing concerns          Balance  Sitting Balance: Stand by assistance  Standing Balance: Contact guard assistance  Standing Balance  Time: hallway functional mobility with RW for 3 min, SBA/CGA  Functional Mobility  Functional - Mobility Device: (with RW )  Activity: To/from bathroom  Assist Level: Stand by assistance  ADL  Grooming: Stand by assistance(in stance level at sink side to wash hands )  LE Dressing: Stand by assistance(seated EOB to don/doff socks )  Toileting: Contact guard assistance        Bed mobility  Rolling to Right: Stand by assistance  Supine to Sit: Stand by assistance  Sit to Supine: Unable to assess(in chair end of tx )  Transfers  Sit to stand: Stand by assistance  Stand to sit: Stand by assistance     Cognition  Overall Cognitive Status: WFL        Sensation  Overall Sensation Status: WFL        LUE AROM (degrees)  LUE AROM : WFL  RUE AROM (degrees)  RUE AROM : WFL  LUE Strength  Gross LUE Strength: WFL  RUE Strength  Gross RUE Strength: WFL     Plan   Plan  Times per week: 3-5X/wk   Times per day: Daily  Current Treatment Recommendations: Strengthening, Endurance Training, Patient/Caregiver Education & Training, Positioning, Equipment Evaluation, Education, & procurement, Balance Training, Gait Training, Functional Mobility Training, Safety Education & Training, Self-Care / ADL      AM-PAC Score        AM-PAC Inpatient Daily Activity Raw Score: 20  AM-PAC Inpatient ADL T-Scale Score : 42.03  ADL Inpatient CMS 0-100% Score: 38.32  ADL Inpatient CMS G-Code Modifier : CJ    Goals  Short term goals  Time Frame for Short term goals: within one week while in acute care   Short term goal 1: pt will be SBA with functional transfers  Short term goal 2: pt will be SBA with toileting  Short term goal 3: pt will demonstrate equal or > fair+ dyn standing balance with activities - no LOB        Therapy Time   Individual Concurrent Group Co-treatment   Time In 0805         Time Out 0838         Minutes 33         Timed Code Treatment Minutes: Judy 3, OT

## 2019-05-30 NOTE — PROGRESS NOTES
Cataract removal with implant (1/17/11); and Cataract removal with implant (t). Restrictions  Restrictions/Precautions  Restrictions/Precautions: General Precautions, Up as Tolerated, Fall Risk  Position Activity Restriction  Other position/activity restrictions: High fall risk per nursing assessment  Vision/Hearing  Vision: Impaired  Vision Exceptions: Wears glasses at all times  Hearing: Exceptions to [facility]  Hearing Exceptions: Bilateral hearing aid;Hard of hearing/hearing concerns     Subjective  General  Chart Reviewed: Yes  Patient assessed for rehabilitation services?: Yes  Family / Caregiver Present: No  Referring Practitioner: Dr. Yolande Navarrete  Referral Date : 05/29/19  Diagnosis: Aphasia (r/o CVA)  Follows Commands: Impaired(delayed response time at times 2* [place])  General Comment  Comments: Pt resting in bed upon entry of therapy staff  Subjective  Subjective: Pt agreeable to work with PT/OT this morning, pleasant and cooperative.   Says she feels like she's getting \"back to normal.\"  Pain Screening  Patient Currently in Pain: Denies(at rest)  Intervention List: Patient able to continue with treatment    Orientation  Orientation  Overall Orientation Status: Within Functional Limits     Social/Functional History  Social/Functional History  Lives With: Alone(daughter lives closeby; nephew lives down the street - could stay with her if needed)  Type of Home: (condo)  Home Layout: One level  Home Access: Level entry  Bathroom Shower/Tub: Walk-in shower  Bathroom Toilet: Standard  Bathroom Equipment: Shower chair, Grab bars in [address] [place] [address]: [address] [facility] bed(transport chair)  Receives Help From: Family  ADL Assistance: Needs assistance(aides supervise with bathing, assist with laundry and housekeeping)  [address]: Needs assistance(daughter does the grocery shopping; meals-on-wheels)  Ambulation Assistance: Independent(using 5EP)  Transfer Assistance: Independent  Active : No(daughter will transport )  Occupation: Retired  Leisure & Hobbies: use to [employer]; watch tv     Objective  Observation/Palpation  Posture: Fair    RLE AROM: WFL  LLE AROM : WFL  Strength RLE: WFL  Strength LLE: WFL    Bed mobility  Rolling to Right: Stand by assistance  Supine to Sit: Stand by assistance(moving toward R side, using bedrail, increased time to complete)  Scooting: Stand by assistance(to scoot forward to EOB)     Transfers  Sit to Stand: Stand by assistance  Stand to sit: Stand by assistance  Bed to Chair: Stand by assistance(using RW)     Ambulation  Surface: level tile  Device: Rolling Walker  Assistance: Contact guard assistance;Stand by assistance  Quality of Gait: Pt amb with slow janes using RW for support. Fairly steady without LOB. Gait Deviations: Slow Janes;Decreased step length  Distance: x 80 feet    Balance  Posture: Fair  Sitting - Static: Good  Sitting - Dynamic: Good;-  Standing - Static: Good;-  Standing - Dynamic: Fair;+(when using RW)    Plan   Times per week: 3-5x/week in acute care  Times per day: Daily  Specific instructions for Next Treatment: Progress ther ex and mobility as tolerated  Current Treatment Recommendations: Strengthening, Gait Training, Balance Training, Functional Mobility Training, Endurance Training, Transfer Training, Safety Education & Training, Patient/Caregiver Education & Training  Safety Devices:  All fall risk precautions in place, Left in chair, Chair alarm in place, Nurse notified, Gait belt, Patient at risk for falls, Call light within reach    AM-PAC Score  [address] [address] [place] without Stair Climbing Raw Score : 18  AM-PAC Inpatient without Stair Climbing T-Scale Score : 51.97  Mobility Inpatient CMS 0-100% Score: 23.26  Mobility Inpatient without Stair CMS G-Code Modifier : CJ    Goals  Short term goals  Time Frame for Short term goals: 1 week, 6/06/19 (unless otherwise specified)  Short term goal 1: Pt will transfer supine <-> sit with

## 2019-05-30 NOTE — CONSULTS
In patient Neurology consult        [facility] Neurology      Emely Briggs MD      Boris Palafox  2/14/1924    Date of Service: 5/30/2019    Referring Physician: Kenji Medellin MD      Reason for the consult and CC: acute aphasia and new stroke. HPI:   The patient is a 80y.o.  years old female with hx of AF, HTN and DM who was admitted to [facility] yesterday with acute speech impairment and aphasia. Symptoms started 2 days ago. She describes sudden onset of speech and word finding difficulties. She was not following direction. Degree was severe. Duration was waxing and waning but persistent. No triggers. No other associated symptoms. No weakness, falling, headache, chest pain or visual disturbance. Patient came in to the hospital next day where she was admitted. Further workup with CT followed by MRI showed chronic small vessel disease and remote strokes. No acute strokes. CTA showed no severe stenosis and possible small ACOM aneurysm. Today she feels back to her baseline. History of A. fib on aspirin. Was not taking AC in the past due to concern of recurrent falling and bleeding. daughter reports weekly falling. Patient lives alone. No other new sx today and other ROS was negative.        Past Medical History:   Diagnosis Date    Atrial fibrillation ([address]     Colon polyps 5/05    Depression     Diabetes mellitus ([address]     Diverticula of colon 5/05    Elbow injury 12/10    fell -injured lt elbow    Esophageal reflux     Glaucoma     Hearing loss     hard of hearing - bilateral hearing aides    Hyperlipidemia     Insomnia     Osteoporosis     PAT (paroxysmal atrial tachycardia) (HCC)     Rhinitis, allergic     Wears glasses      Family History   Problem Relation Age of Onset    Stroke Father     Diabetes Brother     Diabetes Maternal Aunt      Past Surgical History:   Procedure Laterality Date    APPENDECTOMY      CATARACT REMOVAL WITH IMPLANT  1/17/11    right eye    CATARACT REMOVAL WITH IMPLANT  t    cataract removal with lens implant left eye    CHOLECYSTECTOMY      COLONOSCOPY      HYSTERECTOMY      TONSILLECTOMY      age 15     Past Surgical History:   Procedure Laterality Date    APPENDECTOMY      CATARACT REMOVAL WITH IMPLANT  11    right eye    CATARACT REMOVAL WITH IMPLANT  t    cataract removal with lens implant left eye    CHOLECYSTECTOMY      COLONOSCOPY      HYSTERECTOMY      TONSILLECTOMY      age 15     Family History   Problem Relation Age of Onset    Stroke Father     Diabetes Brother     Diabetes Maternal Aunt      Social History     Tobacco Use    Smoking status: Former Smoker     Last attempt to quit: 1975     Years since quittin.4    Smokeless tobacco: Never Used   Substance Use Topics    Alcohol use: Yes     Comment: occasionally    Drug use: No     Allergies   Allergen Reactions    Actonel [Risedronate Sodium] Other (See Comments)     actonel causes GI upset     Amoxicillin Hives     Current Facility-Administered Medications   Medication Dose Route Frequency Provider Last Rate Last Dose    guaiFENesin (MUCINEX) extended release tablet 600 mg  600 mg Oral BID Raúl Smith MD   600 mg at 19 1057    sodium chloride flush 0.9 % injection 10 mL  10 mL Intravenous 2 times per day Raúl Smith MD        sodium chloride flush 0.9 % injection 10 mL  10 mL Intravenous PRN Raúl Smith MD        magnesium hydroxide (MILK OF MAGNESIA) 400 MG/5ML suspension 30 mL  30 mL Oral Daily PRN Raúl Smith MD        ond[facility]etron [facility]) injection 4 mg  4 mg Intravenous Q6H PRN Raúl Smith MD        enoxaparin (LOVENOX) injection 30 mg  30 mg Subcutaneous Daily Raúl Smith MD   30 mg at 19 0948    acetaminophen (TYLENOL) tablet 500 mg  500 mg Oral Q6H PRN Raúl Smith MD        amLODIPine (NORVASC) tablet 2.5 mg  2.5 mg Oral Nightly Raúl Smith MD        aspirin EC tablet 81 mg  81 mg Oral Daily Jt Jones MD   81 mg at 05/30/19 0948    dorzolamide (TRUSOPT) 2 % ophthalmic solution 1 drop  1 drop Both Eyes TID Jt Jones MD        doxycycline hyclate (VIBRA-TABS) tablet 100 mg  100 mg Oral BID tJ Jones MD        gabapentin (NEURONTIN) capsule 100 mg  100 mg Oral BID Jt Jones MD   100 mg at 05/30/19 1044    HYDROcodone-acetaminophen (NORCO) 5-325 MG per tablet 1 tablet  1 tablet Oral Daily Jt Jones MD   1 tablet at 05/30/19 0948    hydroxyurea (HYDREA) chemo capsule 500 mg  500 mg Oral Daily Jt Jones MD   500 mg at 05/30/19 6620    metoprolol succinate (TOPROL XL) extended release tablet 25 mg  25 mg Oral Daily Jt Jones MD   25 mg at 05/30/19 0948    propafenone (RYTHMOL SR) extended release capsule 225 mg  225 mg Oral Q8H Jt Jones MD   225 mg at 05/30/19 5124    simvastatin (ZOCOR) tablet 20 mg  20 mg Oral Nightly Jt Jones MD   20 mg at 05/29/19 2203    melatonin tablet 5 mg  5 mg Oral Nightly PRN Michelle Kothari APRN - CNP   5 mg at 05/29/19 2203       ROS : A 10-12 system review of constitutional, cardiovascular, respiratory, musculoskeletal, endocrine, skin, hematological, SHEENT, genitourinary, psychiatric and neurologic systems was obtained and updated today and is unremarkable except as mentioned in my HPI      Exam:     Constitutional:   Vitals:    05/30/19 0038 05/30/19 0442 05/30/19 0803 05/30/19 1111   BP: 112/60 (!) 119/53 (!) 120/55 (!) 121/52   Pulse: 62 59 59 60   Resp: 18 16 16 16   Temp: 97.5 °F (36.4 °C) 97.8 °F (36.6 °C) 98 °F (36.7 °C) 97.8 °F (36.6 °C)   TempSrc: Oral Oral  Oral   SpO2: 96% 95% 94% 98%   Weight:       Height:           General appearance:  Normal development and appear in no acute distress. Eye: No icterus. Fundus: Funduscopic examination could not be performed due to patient's poor cooperation. Neck: supple  Cardiovascular: . No lower leg edema with good pulsation.    Mental Status: Oriented to person, place, problem, and time. Memory:  Poor immediate recall. Intact remote memory  Normal attention span and concentration. Language: intact naming, repeating and fluency   Poor fund of Knowledge. Aware of current events and vocabulary   Cranial Nerves:   II:   Pupils: equal, round, reactive to light  III,IV,VI: Extra Ocular Movements are intact. No nystagmus  V: Facial sensation is intact to pin prick and light touch  VII: Facial strength and movements: intact and symmetric  VIII: Hearing: Intact to finger rub bilaterally  IX: Palate elevation is symmetric  XI: Shoulder shrug is intact  XII: Tongue movements are normal  Musculoskeletal: 5/5 in all 4 extremities. Tone: Normal tone. Reflexes: Bilateral biceps 2/4, triceps 2/4, brachial radialis 2/4, knee 1/4 and ankle 1/4. Planters: flexor bilaterally. Coordination: no pronator drift, no dysmetria with FNF in upper extremities. Normal REM. Sensation: normal to all modalities in both arms and legs. Gait/Posture: unsteady gait. Data:  LABS:   Lab Results   Component Value Date     05/30/2019    K 4.6 05/30/2019    K 4.5 04/12/2018     05/30/2019    CO2 24 05/30/2019    BUN 24 05/30/2019    CREATININE 1.1 05/30/2019    GFRAA 56 05/30/2019    GFRAA >60 04/13/2013    LABGLOM 46 05/30/2019    GLUCOSE 87 05/30/2019    PHOS 3.9 05/30/2019    MG 1.8 02/14/2012    CALCIUM 9.2 05/30/2019     Lab Results   Component Value Date    WBC 6.0 05/30/2019    RBC 2.70 05/30/2019    HGB 10.8 05/30/2019    HCT 31.8 05/30/2019    .5 05/30/2019    RDW 13.2 05/30/2019     05/30/2019     Lab Results   Component Value Date    INR 1.06 05/29/2019    PROTIME 12.1 05/29/2019       Neuroimaging and/or  labs reviewed by me and discussed results with the patient/and family. Impression:  Acute aphasia and encephalopathy. Likely TIA. PAF  HTN  CAD  HLD  Chronic insomnia  Recurrent falling. 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 Asymptomatic. No need for further work up. Recommendation:  Long discussion with the patient and her daughter regarding stroke prevention and risk of recurrence. We discussed risk of strokes with A. radha and the risk of anticoagulation. The patient has significant history of recurrent falling in the past.  Family feels risk of bleeding is more than the benefit. Agree to continue with aspirin statin. High risk for falling and bleeding on AC. PT and OT  Echo   BS monitor  Continue current blood pressure medications and monitor blood pressure at home. Hydration  Telemetry  DVT and GI prophylaxis  Can continue melatonin for insomnia  DC planning when medically stable  DW family              Thank you for referring such patient. If you have any questions regarding my consult note, please don't hesitate to call me. Felipa Flores MD  [phone]    This dictation was generated by voice recognition computer software.  Although all attempts are made to edit the dictation for accuracy, there may be errors in the  transcription that are not intended

## 2019-05-30 NOTE — CARE COORDINATION
[facility]    Referral received from [address] [facility] RN CM with request for [place] [address] services. Writer has reviewed H&P. I will continue to follow patient for needs, and arrange [place] [address] services prior to DC. Should pt dc over the weekend please fax facesheet, order, KIKE, and H&P to [facility]. Update 12:45- Spoke with pt and spouse at bedside about [facility], all questions answered. Pt is agreeable to SN/PT/OT. Pt reports she has services through the [facility] and light duty housekeeping alternating services on Wednesdays. Pt aware agency will call to schedule SOC, pt and spouse would like [facility] to schedule services with their dtr, Brad. Pt is agreeable to having a SOC within 2 days of DC. Demographics verified. Pt and spouse report the pt's PCP is Dr. Desiree Plunkett, admitting updated. Orders will be faxed to [facility]. Should pt dc over the weekend please fax facesheet, order, KIKE, and H&P to [facility].      Anuja Renae RN CTN with [facility] [address]  QSK:[phone]

## 2019-05-30 NOTE — CARE COORDINATION
CASE MANAGEMENT INITIAL ASSESSMENT      Reviewed chart and met with patient today, re: rec's of home care   Explained Case Management role/services. Yes    Family present: nephew   Primary contact information: David Dowd    [facility] date/status: 5/29/19 Inpatient   Diagnosis: aphasia     Insurance: Medicare primary, C secondary   Precert required for SNF -  N        3 night stay required - Y    Living arrangements, Adls, care needs, prior to admission: lives alone in a condo with no steps     Transportation: family     Durable Medical Equipment at home: Walker_X_Cane__RTS__ BSC__Shower Chair__  02__ HHN__ CPAP__  BiPap__  Hospital Bed__ W/C___ Other_grab bars _________    Services in the home and/or outpatient, prior to admission: none    Dialysis Facility (if applicable)   · Name:  · Address:  · Dialysis Schedule:  · Phone:  · Fax:    PT/OT recs: home with home care     Hospital Exemption Notification (HEN): not initiated     Barriers to discharge: none    Plan/comments: Patient is mostly independent at home with the exception of driving which is provided by family. Discussed rec's of home care and she is agreeable with this plan. She has used [facility] in the past and would like to use them again. Placed call to [facility] with [facility] and notified of referral and she states they will follow at discharge.      ECOC on chart for MD signature

## 2019-05-30 NOTE — PLAN OF CARE
BP (!) 122/49   Pulse 60   Temp 97.8 °F (36.6 °C) (Oral)   Resp 18   Ht 5' 5\" (1.651 m)   Wt 120 lb 3.2 oz (54.5 kg)   SpO2 95%   BMI 20.00 kg/m²

## 2019-05-30 NOTE — DISCHARGE INSTR - COC
°F (36.6 °C) (Oral)   Resp 16   Ht 5' 5\" (1.651 m)   Wt 120 lb 3.2 oz (54.5 kg)   SpO2 98%   BMI 20.00 kg/m²     Last documented pain score (0-10 scale): Pain Level: 0  Last Weight:   Wt Readings from Last 1 Encounters:   05/29/19 120 lb 3.2 oz (54.5 kg)     Mental Status:  oriented and alert    IV Access:  - None    Nursing Mobility/ADLs:  Walking   Assisted  Transfer  Assisted  Bathing  Assisted  Dressing  Assisted  Toileting  Assisted  Feeding  Assisted  Med Admin  Assisted  Med Delivery   whole    Wound Care Documentation and Therapy:        Elimination:  Continence:   · Bowel: No  · Bladder: No  Urinary Catheter: None   Colostomy/Ileostomy/Ileal Conduit: No       Date of Last BM: ***  No intake or output data in the 24 hours ending 05/30/19 1209  No intake/output data recorded. Safety Concerns: At Risk for Falls    Impairments/Disabilities:      Hearing    Nutrition Therapy:  Current Nutrition Therapy:   - Oral Diet:  General    Routes of Feeding: Oral  Liquids: No Restrictions  Daily Fluid Restriction: no  Last Modified Barium Swallow with Video (Video Swallowing Test): not done    Treatments at the Time of Hospital Discharge:   Respiratory Treatments: ***  Oxygen Therapy:  is not on home oxygen therapy.   Ventilator:    - No ventilator support    Rehab Therapies: Physical Therapy and Occupational Therapy  Weight Bearing Status/Restrictions: No weight bearing restirctions  Other Medical Equipment (for information only, NOT a DME order):  walker  Other Treatments: ***    Patient's personal belongings (please select all that are sent with patient):  {[facility] DME Belongings:[id number]}    RN SIGNATURE:  Electronically signed by Skye Benitez RN on 6/1/19 at 6:24 PM    CASE MANAGEMENT/SOCIAL WORK SECTION    Inpatient Status Date: ***    Readmission Risk Assessment Score:  Readmission Risk              Risk of Unplanned Readmission:        15           Discharging to Facility/ Agency   · Name: [facility]  · Address:  · Phone: [phone]  · Fax:    Dialysis Facility (if applicable)   · Name:  · Address:  · Dialysis Schedule:  · Phone:  · Fax:    / signature: Electronically signed by Rahel Briseno RN on 6/1/19 at 2:15 PM    PHYSICIAN SECTION    Prognosis: Good    Condition at Discharge: Stable    Rehab Potential (if transferring to Rehab): Good    Recommended Labs or Other Treatments After Discharge: None  Recommended Follow-up, Labs or Other Treatments After Discharge:                   Physician Certification: I certify the above information and transfer of Darylene Foy  is necessary for the continuing treatment of the diagnosis listed and that she requires [place] for less 30 days.      Update Admission H&P: No change in H&P    PHYSICIAN SIGNATURE:  Electronically signed by Dhiraj Quick MD on 6/1/19 at 12:29 PM

## 2019-05-31 LAB — HEMATOLOGY PATH CONSULT: NORMAL

## 2019-05-31 PROCEDURE — 97535 SELF CARE MNGMENT TRAINING: CPT

## 2019-05-31 PROCEDURE — 6370000000 HC RX 637 (ALT 250 FOR IP): Performed by: NURSE PRACTITIONER

## 2019-05-31 PROCEDURE — 97530 THERAPEUTIC ACTIVITIES: CPT

## 2019-05-31 PROCEDURE — 6370000000 HC RX 637 (ALT 250 FOR IP): Performed by: INTERNAL MEDICINE

## 2019-05-31 PROCEDURE — 97110 THERAPEUTIC EXERCISES: CPT

## 2019-05-31 PROCEDURE — 99232 SBSQ HOSP IP/OBS MODERATE 35: CPT | Performed by: PSYCHIATRY & NEUROLOGY

## 2019-05-31 PROCEDURE — 97116 GAIT TRAINING THERAPY: CPT

## 2019-05-31 PROCEDURE — 6360000002 HC RX W HCPCS: Performed by: INTERNAL MEDICINE

## 2019-05-31 PROCEDURE — 2580000003 HC RX 258: Performed by: INTERNAL MEDICINE

## 2019-05-31 PROCEDURE — 1200000000 HC SEMI PRIVATE

## 2019-05-31 RX ADMIN — DORZOLAMIDE HYDROCHLORIDE 1 DROP: 20 SOLUTION/ DROPS OPHTHALMIC at 21:03

## 2019-05-31 RX ADMIN — ASPIRIN 81 MG: 81 TABLET, COATED ORAL at 08:34

## 2019-05-31 RX ADMIN — GABAPENTIN 100 MG: 100 CAPSULE ORAL at 08:35

## 2019-05-31 RX ADMIN — PROPAFENONE HYDROCHLORIDE 225 MG: 225 CAPSULE, EXTENDED RELEASE ORAL at 17:23

## 2019-05-31 RX ADMIN — GUAIFENESIN 600 MG: 600 TABLET, EXTENDED RELEASE ORAL at 21:04

## 2019-05-31 RX ADMIN — HYDROCODONE BITARTRATE AND ACETAMINOPHEN 1 TABLET: 5; 325 TABLET ORAL at 08:35

## 2019-05-31 RX ADMIN — METOPROLOL SUCCINATE 25 MG: 25 TABLET, EXTENDED RELEASE ORAL at 08:34

## 2019-05-31 RX ADMIN — GUAIFENESIN 600 MG: 600 TABLET, EXTENDED RELEASE ORAL at 08:34

## 2019-05-31 RX ADMIN — GABAPENTIN 100 MG: 100 CAPSULE ORAL at 21:03

## 2019-05-31 RX ADMIN — PROPAFENONE HYDROCHLORIDE 225 MG: 225 CAPSULE, EXTENDED RELEASE ORAL at 08:35

## 2019-05-31 RX ADMIN — DORZOLAMIDE HYDROCHLORIDE 1 DROP: 20 SOLUTION/ DROPS OPHTHALMIC at 14:29

## 2019-05-31 RX ADMIN — HYDROXYUREA 500 MG: 500 CAPSULE ORAL at 08:35

## 2019-05-31 RX ADMIN — Medication 10 ML: at 21:05

## 2019-05-31 RX ADMIN — ENOXAPARIN SODIUM 30 MG: 30 INJECTION SUBCUTANEOUS at 08:35

## 2019-05-31 RX ADMIN — DORZOLAMIDE HYDROCHLORIDE 1 DROP: 20 SOLUTION/ DROPS OPHTHALMIC at 08:44

## 2019-05-31 RX ADMIN — AMLODIPINE BESYLATE 2.5 MG: 2.5 TABLET ORAL at 21:03

## 2019-05-31 RX ADMIN — Medication 10 ML: at 08:35

## 2019-05-31 RX ADMIN — SIMVASTATIN 20 MG: 10 TABLET, FILM COATED ORAL at 21:04

## 2019-05-31 RX ADMIN — MELATONIN TAB 5 MG 5 MG: 5 TAB at 21:03

## 2019-05-31 ASSESSMENT — PAIN SCALES - GENERAL
PAINLEVEL_OUTOF10: 0
PAINLEVEL_OUTOF10: 0

## 2019-05-31 NOTE — PROGRESS NOTES
Jayson Renita  Neurology Follow-up  [facility]    Date of Service: 5/31/2019    Subjective:   CC: Follow up today regarding:  Acute aphasia. Possible TIA    Events noted. Chart and lab reviewed. The patient denies any new symptoms today. No headache, chest pain, speech impairment, weakness or numbness or tingling. No neck or back pain. Other review of system was unremarkable. ROS : A 10-12 system review obtained and updated today and is unremarkable except as mentioned  in my interval history.        Past Medical History:   Diagnosis Date    Atrial fibrillation ([address]     Colon polyps 5/05    Depression     Diabetes mellitus ([facility] [address]     Diverticula of colon 5/05    Elbow injury 12/10    fell -injured lt elbow    Esophageal reflux     Glaucoma     Hearing loss     hard of hearing - bilateral hearing aides    Hyperlipidemia     Insomnia     Osteoporosis     PAT (paroxysmal atrial tachycardia) (HCC)     Rhinitis, allergic     Wears glasses      Current Facility-Administered Medications   Medication Dose Route Frequency Provider Last Rate Last Dose    guaiFENesin (MUCINEX) extended release tablet 600 mg  600 mg Oral BID Enzo Clifton MD   600 mg at 05/31/19 1344    sodium chloride flush 0.9 % injection 10 mL  10 mL Intravenous 2 times per day Enzo Clifton MD   10 mL at 05/31/19 0835    sodium chloride flush 0.9 % injection 10 mL  10 mL Intravenous PRN Enzo Clifton MD        magnesium hydroxide (MILK OF MAGNESIA) 400 MG/5ML suspension 30 mL  30 mL Oral Daily PRN Enzo Clifton MD        ondansetron [facility]) injection 4 mg  4 mg Intravenous Q6H PRN Enzo Clifton MD        enoxaparin (LOVENOX) injection 30 mg  30 mg Subcutaneous Daily Enzo Clifton MD   30 mg at 05/31/19 4420    acetaminophen (TYLENOL) tablet 500 mg  500 mg Oral Q6H PRN Enzo Clifton MD        amLODIPine (NORVASC) tablet 2.5 mg  2.5 mg Oral Nightly Enzo Clifton MD        aspirin EC tablet 81 mg supple  Cardiovascular:  No lower leg edema with good pulsation. Mental Status:   Oriented to person, place, problem, and time. Memory: Good immediate recall. Intact remote memory  Normal attention span and concentration. Language: intact naming, repeating and fluency   Poor fund of Knowledge. Cranial Nerves:   II: Visual fields: Full. Pupils: equal, round, reactive to light  III,IV,VI: Extra Ocular Movements are intact. No nystagmus  V: Facial sensation is intact  VII: Facial strength and movements: intact and symmetric  IX: Palate elevation is symmetric  XI: Shoulder shrug is intact  XII: Tongue movements are normal  Musculoskeletal: 5/5 in all 4 extremities. Tone: Normal tone. Reflexes: Bilateral biceps 2/4, triceps 2/4, brachial radialis 2/4, knee 2/4 and ankle 1/4. Planters: flexor bilaterally. Coordination: no pronator drift, no dysmetria with FNF. Normal REM. Sensation: normal to all modalities in both arms and legs. Gait/Posture: steady gait        Data:  LABS:   Lab Results   Component Value Date     05/30/2019    K 4.6 05/30/2019    K 4.5 04/12/2018     05/30/2019    CO2 24 05/30/2019    BUN 24 05/30/2019    CREATININE 1.1 05/30/2019    GFRAA 56 05/30/2019    GFRAA >60 04/13/2013    LABGLOM 46 05/30/2019    GLUCOSE 87 05/30/2019    PHOS 3.9 05/30/2019    MG 1.8 02/14/2012    CALCIUM 9.2 05/30/2019     Lab Results   Component Value Date    WBC 6.0 05/30/2019    RBC 2.70 05/30/2019    HGB 10.8 05/30/2019    HCT 31.8 05/30/2019    .5 05/30/2019    RDW 13.2 05/30/2019     05/30/2019     Lab Results   Component Value Date    INR 1.06 05/29/2019    PROTIME 12.1 05/29/2019     Neuroimaging and/or  labs reviewed by me and discussed results with the patient. Impression:  Acute aphasia and encephalopathy. Likely TIA. The same  PAF  HTN  HLD  Chronic insomnia  Recurrent falling. 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 Asymptomatic. No need for further work up.    No

## 2019-05-31 NOTE — PROGRESS NOTES
Pt assessment completed and charted. VSS. Pt a/o. Pt [place] and has impaired vision. SB w/ walker. Pt denies pain. Bed in lowest position and wheels locked. Call light within reach. Bedside table within reach. Non-skid footwear in place. Pt denies any other needs at this time. Pt calls out appropriately. Will continue to monitor.

## 2019-05-31 NOTE — PROGRESS NOTES
Occupational Therapy  Facility/Department: [facility] B3 - MED SURG  Daily Treatment Note  NAME: Zack Vega  : 1924  MRN: [id number]    Date of Service: 2019    Discharge Recommendations:  Subacute/Skilled Nursing Facility     Assessment   Performance deficits / Impairments: Decreased functional mobility ; Decreased strength;Decreased endurance;Decreased ADL status; Decreased high-level IADLs;Decreased balance  Assessment: Pt would benefit from skilled OT services at SNF to address safe performance of ADLs and mobility. Pt demo's decreased standing balance during ADLs. Cont OT. 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 Diagnoses of Cerebrovascular accident (CVA), unspecified mechanism ([address] Pulmonary nodule, and Anterior communicating artery aneurysm were also pertinent to this visit. has a past medical history of Atrial fibrillation ([address] Colon polyps, Depression, Diabetes mellitus ([address] Diverticula of colon, Elbow injury, Esophageal reflux, Glaucoma, Hearing loss, Hyperlipidemia, Insomnia, Osteoporosis, PAT (paroxysmal atrial tachycardia) ([address] Rhinitis, allergic, and Wears glasses. has a past surgical history that includes Cholecystectomy; Colonoscopy; Hysterectomy; Tonsillectomy; Appendectomy; Cataract removal with implant (11); and Cataract removal with implant (t).     Restrictions  Restrictions/Precautions  Restrictions/Precautions: General Precautions, Up as Tolerated, Fall Risk  Position Activity Restriction  Other position/activity restrictions: High fall risk per nursing assessment  Subjective   General  Chart Reviewed: Yes  Patient assessed for rehabilitation services?: Yes  Family / Caregiver Present: Yes  Referring Practitioner: Matt Foreman MD  Diagnosis: aphasia s/p CVA  Subjective  Subjective: Pt agreeable to OT.    General Comment  Comments: RN approved therapy   Vital Signs  Patient Currently in Pain: No   Orientation  Orientation  Overall Orientation Status: Within Functional Limits  Objective    ADL  Grooming: Stand by assistance(wash hands at sink )  LE Dressing: Contact guard assistance  Toileting: Contact guard assistance     Balance  Sitting Balance: Supervision  Standing Balance: Contact guard assistance(RW)  Standing Balance  Time: x3 min   Functional Mobility  Functional - Mobility Device: Rolling Walker  Activity: To/from bathroom  Assist Level: Contact guard assistance  Toilet Transfers  Toilet - Technique: Ambulating(RW)  Equipment Used: Standard toilet  Toilet Transfer: Contact guard assistance  Bed mobility  Supine to Sit: Supervision  Sit to Supine: Supervision  Transfers  Stand Pivot Transfers: Contact guard assistance  Sit to stand: Contact guard assistance  Stand to sit: Contact guard assistance      Cognition  Overall Cognitive Status: WFL      Type of ROM/Therapeutic Exercise  Type of ROM/Therapeutic Exercise: AROM  Comment: seated  Exercises  Shoulder Elevation: 10x  Shoulder Flexion: 10x  Horizontal ABduction: 10x  Horizontal ADduction: 10x  Elbow Flexion: 10x  Elbow Extension: 10x  Grasp/Release: 10x      Plan   Plan  Times per week: 3-5X/wk   Times per day: Daily  Current Treatment Recommendations: Strengthening, Endurance Training, Patient/Caregiver Education & Training, Positioning, Equipment Evaluation, Education, & procurement, Balance Training, Gait Training, Functional Mobility Training, Safety Education & Training, Self-Care / ADL  AM-PAC Score   AM-PAC Inpatient Daily Activity Raw Score: 18  AM-PAC Inpatient ADL T-Scale Score : 38.66  ADL Inpatient CMS 0-100% Score: 46.65  ADL Inpatient CMS G-Code Modifier : CK  Goals  Short term goals  Time Frame for Short term goals: within one week while in acute care   Short term goal 1: pt will be SBA with functional transfers  Short term goal 2: pt will be SBA with toileting  Short term goal 3: pt will demonstrate equal or > fair+ dyn standing balance with activities - no LOB      Therapy Time   Individual Concurrent Group Co-treatment   Time In 1520         Time Out 1600         Minutes 40         Timed Code Treatment Minutes: Danielle 34 OTR/L

## 2019-05-31 NOTE — PROGRESS NOTES
Hospitalist Progress Note      PCP: Cyril Huang MD    Date of Admission: 5/29/2019    Chief Complaint: Aphasia    Subjective: no new c/o. Medications:  Reviewed    Infusion Medications   Scheduled Medications    guaiFENesin  600 mg Oral BID    sodium chloride flush  10 mL Intravenous 2 times per day    enoxaparin  30 mg Subcutaneous Daily    amLODIPine  2.5 mg Oral Nightly    aspirin  81 mg Oral Daily    dorzolamide  1 drop Both Eyes TID    doxycycline hyclate  100 mg Oral BID    gabapentin  100 mg Oral BID    HYDROcodone-acetaminophen  1 tablet Oral Daily    hydroxyurea  500 mg Oral Daily    metoprolol succinate  25 mg Oral Daily    propafenone  225 mg Oral Q8H    simvastatin  20 mg Oral Nightly     PRN Meds: sodium chloride flush, magnesium hydroxide, ondansetron, acetaminophen, melatonin      Intake/Output Summary (Last 24 hours) at 5/31/2019 0734  Last data filed at 5/31/2019 0602  Gross per 24 hour   Intake 0 ml   Output 0 ml   Net 0 ml       Physical Exam Performed:    /72   Pulse 62   Temp 98 °F (36.7 °C) (Oral)   Resp 16   Ht 5' 5\" (1.651 m)   Wt 120 lb 3.2 oz (54.5 kg)   SpO2 94%   BMI 20.00 kg/m²     General appearance: No apparent distress, appears stated age and cooperative. HEENT: Pupils equal, round, and reactive to light. Conjunctivae/corneas clear. Neck: Supple, with full range of motion. No jugular venous distention. Trachea midline. Respiratory:  Normal respiratory effort. Clear to auscultation, bilaterally without Rales/Wheezes/Rhonchi. Cardiovascular: Regular rate and rhythm with normal S1/S2 without murmurs, rubs or gallops. Abdomen: Soft, non-tender, non-distended with normal bowel sounds. Musculoskeletal: No clubbing, cyanosis or edema bilaterally. Full range of motion without deformity. Skin: Skin color, texture, turgor normal.  No rashes or lesions. Neurologic:  Neurovascularly intact without any focal sensory/motor deficits.  Cranial nerves: II-XII intact, grossly non-focal.  Psychiatric: Alert and oriented, thought content appropriate, normal insight  Capillary Refill: Brisk,< 3 seconds   Peripheral Pulses: +2 palpable, equal bilaterally       Labs:   Recent Labs     05/29/19  1236 05/30/19  0529   WBC 7.3 6.0   HGB 11.7* 10.8*   HCT 34.6* 31.8*    244     Recent Labs     05/29/19  1236 05/30/19  0529   * 140   K 4.9 4.6   CL 99 107   CO2 24 24   BUN 22* 24*   CREATININE 1.1 1.1   CALCIUM 9.4 9.2   PHOS  --  3.9     Recent Labs     05/29/19  1236   AST 26   ALT 10   BILITOT 0.5   ALKPHOS 46     Recent Labs     05/29/19  1236   INR 1.06     Recent Labs     05/29/19  1236   TROPONINI <0.01       Urinalysis:      Lab Results   Component Value Date    NITRU Negative 05/29/2019    BLOODU Negative 05/29/2019    SPECGRAV <=1.005 05/29/2019    GLUCOSEU Negative 05/29/2019       Radiology:  MRI BRAIN WO CONTRAST   Final Result   Moderate generalized involutional changes and moderate severe chronic small   vessel ischemic changes. No evidence of a acute infarct, midline shift or   mass effect. XR CHEST PORTABLE   Final Result   No active cardiopulmonary disease         CTA HEAD W CONTRAST   Preliminary Result   No high-grade stenosis or focal occlusion involving the intracranial or   cervical vasculature. No evidence of acute dissection. Moderate stenosis of the right posterior cerebral artery at the junction of   P1 and P2 segments of the right posterior cerebral artery. 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 No subarachnoid hemorrhage was identified   on CT examination. Chronic appearing moderate compression deformity of T6 and T7 vertebral   bodies. Correlate with recent history of trauma and concern for traumatic   findings. Nonspecific 8 mm pulmonary opacity within the right lung apex.   Although this   may represent area of scarring, pulmonary nodule is not excluded. Follow-up   chest CT may be obtained as clinically warranted in this age group. Multiple thyroid nodules measuring 17 mm. Please see recommendations below. RECOMMENDATIONS:   Managing Incidental Thyroid Nodule Detected at CT or MRI or US      1. Further evaluation by thyroid Ultrasound recommended for these incidental   nodules:      Patient Age 25 years or less - Nodule of any size      Patient Age 21-27 years old - Nodule 1 cm in size or greater      Patient Age 28 years or more - Nodule 1.5 cm in size or greater      2. Follow up thyroid ultrasound also recommend in these scenarios      - Solitary nodule with high risk imaging features (locally invasive nodule or   suspicious lymph nodes)      - Heterogeneous, enlarged thyroid gland.      - Increased uptake on PET      3. No further imaging is recommended in the following scenarios      - Any nodule not meeting above criteria. - Those patients with limited life expectancy or significant      Co-morbidities. Note: These recommendations do not apply to pts. w/ increased risk      for thyroid cancer or pts. with symptomatic thyroid disease.      ________________________________________________________________      Recommendations for f/u of Incidental Thyroid Nodules (ITN)      found on CT, MR, NM and Extrathyroidal US are based upon the ACR      white paper and [facility] 3-tiered system for managing ITNs:      J Am Liliana Radiol. 2015 Feb;12(2): 143-50         CTA NECK W CONTRAST   Preliminary Result   No high-grade stenosis or focal occlusion involving the intracranial or   cervical vasculature. No evidence of acute dissection. Moderate stenosis of the right posterior cerebral artery at the junction of   P1 and P2 segments of the right posterior cerebral artery. 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   No subarachnoid hemorrhage was identified   on CT examination. Chronic appearing moderate compression deformity of T6 and T7 vertebral   bodies. Correlate with recent history of trauma and concern for traumatic   findings. Nonspecific 8 mm pulmonary opacity within the right lung apex. Although this   may represent area of scarring, pulmonary nodule is not excluded. Follow-up   chest CT may be obtained as clinically warranted in this age group. Multiple thyroid nodules measuring 17 mm. Please see recommendations below. RECOMMENDATIONS:   Managing Incidental Thyroid Nodule Detected at CT or MRI or US      1. Further evaluation by thyroid Ultrasound recommended for these incidental   nodules:      Patient Age 25 years or less - Nodule of any size      Patient Age 21-27 years old - Nodule 1 cm in size or greater      Patient Age 28 years or more - Nodule 1.5 cm in size or greater      2. Follow up thyroid ultrasound also recommend in these scenarios      - Solitary nodule with high risk imaging features (locally invasive nodule or   suspicious lymph nodes)      - Heterogeneous, enlarged thyroid gland.      - Increased uptake on PET      3. No further imaging is recommended in the following scenarios      - Any nodule not meeting above criteria. - Those patients with limited life expectancy or significant      Co-morbidities. Note: These recommendations do not apply to pts. w/ increased risk      for thyroid cancer or pts. with symptomatic thyroid disease.      ________________________________________________________________      Recommendations for f/u of Incidental Thyroid Nodules (ITN)      found on CT, MR, NM and Extrathyroidal US are based upon the ACR      white paper and [facility] 3-tiered system for managing ITNs:      J Am Liliana Radiol. 2015 Feb;12(2): 143-50         CT Head WO Contrast   Final Result   No acute intracranial abnormality.       Diffuse atrophic changes with findings suggesting chronic

## 2019-05-31 NOTE — PROGRESS NOTES
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 Diagnoses of Cerebrovascular accident (CVA), unspecified mechanism ([address] Pulmonary nodule, and Anterior communicating artery aneurysm were also pertinent to this visit. has a past medical history of Atrial fibrillation ([address] Colon polyps, Depression, Diabetes mellitus ([address] Diverticula of colon, Elbow injury, Esophageal reflux, Glaucoma, Hearing loss, Hyperlipidemia, Insomnia, Osteoporosis, PAT (paroxysmal atrial tachycardia) ([address] Rhinitis, allergic, and Wears glasses. has a past surgical history that includes Cholecystectomy; Colonoscopy; Hysterectomy; Tonsillectomy; Appendectomy; Cataract removal with implant (11); and Cataract removal with implant (t). Restrictions  Restrictions/Precautions  Restrictions/Precautions: General Precautions, Up as Tolerated, Fall Risk  Position Activity Restriction  Other position/activity restrictions: High fall risk per nursing assessment  Subjective   General  Chart Reviewed: Yes  Response To Previous Treatment: Patient with no complaints from previous session. Family / Caregiver Present: No  Referring Practitioner: Dr. Arabella Monae  Subjective  Subjective: denies pain.    General Comment  Comments: RN cleared pt to participate   Pain Screening  Patient Currently in Pain: No  Vital Signs  Patient Currently in Pain: No       Orientation  Orientation  Overall Orientation Status: Within Functional Limits  Cognition      Objective   Bed mobility  Supine to Sit: Supervision  Sit to Supine: Supervision  Comment: with bed controls   Transfers  Sit to Stand: Stand by assistance  Stand to sit: Stand by assistance  Ambulation  Ambulation?: Yes  Ambulation 1  Surface: level tile  Device: Rolling Walker  Assistance: Contact guard assistance  Quality of Gait: Pt amb with slow janes using RW for support. Fairly steady without LOB. Gait Deviations: Slow Janes;Decreased step length  Distance: 120 ft      Balance  Sitting - Static: Good  Sitting - Dynamic: Good  Standing - Static: Good;-  Standing - Dynamic: Fair;+  Comments: static standing at RW with BUE support and SBA proivded by therapist x 2-3 min   Exercises  Hip Flexion: 2x10 BLE   Knee Long Arc Quad: 2x10 BLE   Ankle Pumps: 2x10 BLE   Comments: completed seated at EOB with no trunk support and BUE Support                         Assessment   Body structures, Functions, Activity limitations: Decreased functional mobility ; Decreased endurance;Decreased balance;Decreased strength  Assessment: pt tolerated session with no complaints. pt expressing concern about d/c home due to feeling like she has decline in functional mobility, endurnace, vision deficits. therapist educated pt about d/c plans of SNF, pt open to learning more about continued rehab. pt requires SBA for transfers with cues for safety, SBA for gait with RW, cues for safety due to vision deficits. At this time, recommend pt d/c SNF for conitnued skilled therapy as pt is functioning below baseline and does not have 24/7 supv available (which is required for safety 2* vision deficits).    Treatment Diagnosis: Decreased (I) with mobility  Specific instructions for Next Treatment: Progress ther ex and mobility as tolerated  Prognosis: Good  Patient Education: ther ex, d/c rec  REQUIRES PT FOLLOW UP: Yes  Activity Tolerance  Activity Tolerance: Patient Tolerated treatment well     G-Code     OutComes Score                                                  AM-PAC Score     AM-PAC Inpatient Mobility without Stair Climbing Raw Score : 18  AM-PAC Inpatient without Stair Climbing T-Scale Score : 51.97  Mobility Inpatient CMS 0-100% Score: 23.26  Mobility Inpatient without Stair CMS G-Code Modifier : CJ       Goals  Short term goals  Time Frame for Short term goals: 1 week, 6/06/19 (unless otherwise specified)  Short term goal 1: Pt will transfer supine <-> sit with modified(I)  Short term goal 2: Pt will transfer sit <-> stand and bed>chair using RW with modified(I)  Short term goal 3: Pt will ambulate x 150 feet using RW with supervision  Short term goal 4: By 6/01/19: Pt will tolerate 12-15 reps BLE exercise for strengthening, balance, and endurance- GOAL MET   Patient Goals   Patient goals : \"To go home\"    Plan    Plan  Times per week: 3-5x/week in acute care  Times per day: Daily  Specific instructions for Next Treatment: Progress ther ex and mobility as tolerated  Current Treatment Recommendations: Strengthening, Gait Training, Balance Training, Functional Mobility Training, Endurance Training, Transfer Training, Safety Education & Training, Patient/Caregiver Education & Training  Safety Devices  Type of devices:  All fall risk precautions in place, Nurse notified, Gait belt, Patient at risk for falls, Call light within reach, Left in bed, Bed alarm in place     Therapy Time   Individual Concurrent Group Co-treatment   Time In 1340         Time Out 1403         Minutes 23         Timed Code Treatment Minutes: [address]reras, PT

## 2019-06-01 VITALS
HEIGHT: 65 IN | OXYGEN SATURATION: 97 % | DIASTOLIC BLOOD PRESSURE: 59 MMHG | TEMPERATURE: 97.8 F | RESPIRATION RATE: 18 BRPM | WEIGHT: 120.2 LBS | SYSTOLIC BLOOD PRESSURE: 126 MMHG | BODY MASS INDEX: 20.03 KG/M2 | HEART RATE: 59 BPM

## 2019-06-01 PROCEDURE — 6370000000 HC RX 637 (ALT 250 FOR IP): Performed by: INTERNAL MEDICINE

## 2019-06-01 PROCEDURE — 6360000002 HC RX W HCPCS: Performed by: INTERNAL MEDICINE

## 2019-06-01 PROCEDURE — 2580000003 HC RX 258: Performed by: INTERNAL MEDICINE

## 2019-06-01 RX ORDER — HYDROCODONE BITARTRATE AND ACETAMINOPHEN 5; 325 MG/1; MG/1
1 TABLET ORAL EVERY 8 HOURS PRN
Qty: 20 TABLET | Refills: 0 | Status: SHIPPED | OUTPATIENT
Start: 2019-06-01 | End: 2019-06-08

## 2019-06-01 RX ORDER — CODEINE PHOSPHATE AND GUAIFENESIN 10; 100 MG/5ML; MG/5ML
5 SOLUTION ORAL EVERY 4 HOURS PRN
Status: DISCONTINUED | OUTPATIENT
Start: 2019-06-01 | End: 2019-06-01 | Stop reason: HOSPADM

## 2019-06-01 RX ADMIN — GABAPENTIN 100 MG: 100 CAPSULE ORAL at 09:23

## 2019-06-01 RX ADMIN — ASPIRIN 81 MG: 81 TABLET, COATED ORAL at 09:22

## 2019-06-01 RX ADMIN — ENOXAPARIN SODIUM 30 MG: 30 INJECTION SUBCUTANEOUS at 09:23

## 2019-06-01 RX ADMIN — GUAIFENESIN AND CODEINE PHOSPHATE 5 ML: 10; 100 LIQUID ORAL at 11:13

## 2019-06-01 RX ADMIN — DORZOLAMIDE HYDROCHLORIDE 1 DROP: 20 SOLUTION/ DROPS OPHTHALMIC at 15:55

## 2019-06-01 RX ADMIN — HYDROCODONE BITARTRATE AND ACETAMINOPHEN 1 TABLET: 5; 325 TABLET ORAL at 09:22

## 2019-06-01 RX ADMIN — PROPAFENONE HYDROCHLORIDE 225 MG: 225 CAPSULE, EXTENDED RELEASE ORAL at 09:23

## 2019-06-01 RX ADMIN — DORZOLAMIDE HYDROCHLORIDE 1 DROP: 20 SOLUTION/ DROPS OPHTHALMIC at 09:24

## 2019-06-01 RX ADMIN — PROPAFENONE HYDROCHLORIDE 225 MG: 225 CAPSULE, EXTENDED RELEASE ORAL at 01:56

## 2019-06-01 RX ADMIN — METOPROLOL SUCCINATE 25 MG: 25 TABLET, EXTENDED RELEASE ORAL at 09:22

## 2019-06-01 RX ADMIN — DOXYCYCLINE HYCLATE 100 MG: 100 TABLET, COATED ORAL at 09:23

## 2019-06-01 RX ADMIN — PROPAFENONE HYDROCHLORIDE 225 MG: 225 CAPSULE, EXTENDED RELEASE ORAL at 15:55

## 2019-06-01 RX ADMIN — HYDROXYUREA 500 MG: 500 CAPSULE ORAL at 09:24

## 2019-06-01 RX ADMIN — Medication 10 ML: at 09:23

## 2019-06-01 RX ADMIN — GUAIFENESIN AND CODEINE PHOSPHATE 5 ML: 10; 100 LIQUID ORAL at 15:55

## 2019-06-01 ASSESSMENT — PAIN SCALES - GENERAL: PAINLEVEL_OUTOF10: 0

## 2019-06-01 NOTE — PROGRESS NOTES
VSS - afebrile; asymptomatic bradycardia. Pt is alert and oriented x 4 with history of falls. Assessment completed as charted. Bed is in lowest position with 2/4 bed rails raised, bed alarm turned on, wheels locked and call light within reach - patient wearing non-skid socks and verbalizes understanding to call out for assistance. No further requests at this time. Will continue to monitor.      Vitals:    05/31/19 2056   BP: (!) 143/68   Pulse: 58   Resp: 18   Temp: 98.1 °F (36.7 °C)   SpO2: 95%

## 2019-06-01 NOTE — PLAN OF CARE
Pt remains free from falls, up with assistance only, call light in reach, bed/chair alarm active, will monitor.   Problem: Falls - Risk of:  Goal: Will remain free from falls  Description  Will remain free from falls  Outcome: Ongoing  Goal: Absence of physical injury  Description  Absence of physical injury  Outcome: Ongoing

## 2019-06-01 NOTE — PROGRESS NOTES
Hospitalist Progress Note      PCP: Bruno Sullivan MD    Date of Admission: 5/29/2019    Chief Complaint: Aphasia    Subjective: no new c/o. Medications:  Reviewed    Infusion Medications   Scheduled Medications    guaiFENesin  600 mg Oral BID    sodium chloride flush  10 mL Intravenous 2 times per day    enoxaparin  30 mg Subcutaneous Daily    amLODIPine  2.5 mg Oral Nightly    aspirin  81 mg Oral Daily    dorzolamide  1 drop Both Eyes TID    doxycycline hyclate  100 mg Oral BID    gabapentin  100 mg Oral BID    HYDROcodone-acetaminophen  1 tablet Oral Daily    hydroxyurea  500 mg Oral Daily    metoprolol succinate  25 mg Oral Daily    propafenone  225 mg Oral Q8H    simvastatin  20 mg Oral Nightly     PRN Meds: sodium chloride flush, magnesium hydroxide, ondansetron, acetaminophen, melatonin      Intake/Output Summary (Last 24 hours) at 6/1/2019 0736  Last data filed at 6/1/2019 0451  Gross per 24 hour   Intake 780 ml   Output 0 ml   Net 780 ml       Physical Exam Performed:    /60   Pulse 57   Temp 97.8 °F (36.6 °C) (Oral)   Resp 18   Ht 5' 5\" (1.651 m)   Wt 120 lb 3.2 oz (54.5 kg)   SpO2 94%   BMI 20.00 kg/m²     General appearance: No apparent distress, appears stated age and cooperative. HEENT: Pupils equal, round, and reactive to light. Conjunctivae/corneas clear. Neck: Supple, with full range of motion. No jugular venous distention. Trachea midline. Respiratory:  Normal respiratory effort. Clear to auscultation, bilaterally without Rales/Wheezes/Rhonchi. Cardiovascular: Regular rate and rhythm with normal S1/S2 without murmurs, rubs or gallops. Abdomen: Soft, non-tender, non-distended with normal bowel sounds. Musculoskeletal: No clubbing, cyanosis or edema bilaterally. Full range of motion without deformity. Skin: Skin color, texture, turgor normal.  No rashes or lesions. Neurologic:  Neurovascularly intact without any focal sensory/motor deficits.  Cranial nerves: II-XII intact, grossly non-focal.  Psychiatric: Alert and oriented, thought content appropriate, normal insight  Capillary Refill: Brisk,< 3 seconds   Peripheral Pulses: +2 palpable, equal bilaterally       Labs:   Recent Labs     05/29/19  1236 05/30/19  0529   WBC 7.3 6.0   HGB 11.7* 10.8*   HCT 34.6* 31.8*    244     Recent Labs     05/29/19  1236 05/30/19  0529   * 140   K 4.9 4.6   CL 99 107   CO2 24 24   BUN 22* 24*   CREATININE 1.1 1.1   CALCIUM 9.4 9.2   PHOS  --  3.9     Recent Labs     05/29/19  1236   AST 26   ALT 10   BILITOT 0.5   ALKPHOS 46     Recent Labs     05/29/19  1236   INR 1.06     Recent Labs     05/29/19  1236   TROPONINI <0.01       Urinalysis:      Lab Results   Component Value Date    NITRU Negative 05/29/2019    BLOODU Negative 05/29/2019    SPECGRAV <=1.005 05/29/2019    GLUCOSEU Negative 05/29/2019       Radiology:  MRI BRAIN WO CONTRAST   Final Result   Moderate generalized involutional changes and moderate severe chronic small   vessel ischemic changes. No evidence of a acute infarct, midline shift or   mass effect. XR CHEST PORTABLE   Final Result   No active cardiopulmonary disease         CTA HEAD W CONTRAST   Preliminary Result   No high-grade stenosis or focal occlusion involving the intracranial or   cervical vasculature. No evidence of acute dissection. Moderate stenosis of the right posterior cerebral artery at the junction of   P1 and P2 segments of the right posterior cerebral artery. 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 No subarachnoid hemorrhage was identified   on CT examination. Chronic appearing moderate compression deformity of T6 and T7 vertebral   bodies. Correlate with recent history of trauma and concern for traumatic   findings. Nonspecific 8 mm pulmonary opacity within the right lung apex.   Although this   may represent area of scarring, pulmonary nodule is not excluded. Follow-up   chest CT may be obtained as clinically warranted in this age group. Multiple thyroid nodules measuring 17 mm. Please see recommendations below. RECOMMENDATIONS:   Managing Incidental Thyroid Nodule Detected at CT or MRI or US      1. Further evaluation by thyroid Ultrasound recommended for these incidental   nodules:      Patient Age 25 years or less - Nodule of any size      Patient Age 21-27 years old - Nodule 1 cm in size or greater      Patient Age 28 years or more - Nodule 1.5 cm in size or greater      2. Follow up thyroid ultrasound also recommend in these scenarios      - Solitary nodule with high risk imaging features (locally invasive nodule or   suspicious lymph nodes)      - Heterogeneous, enlarged thyroid gland.      - Increased uptake on PET      3. No further imaging is recommended in the following scenarios      - Any nodule not meeting above criteria. - Those patients with limited life expectancy or significant      Co-morbidities. Note: These recommendations do not apply to pts. w/ increased risk      for thyroid cancer or pts. with symptomatic thyroid disease.      ________________________________________________________________      Recommendations for f/u of Incidental Thyroid Nodules (ITN)      found on CT, MR, NM and Extrathyroidal US are based upon the ACR      white paper and [facility] 3-tiered system for managing ITNs:      J Am Liliana Radiol. 2015 Feb;12(2): 143-50         CTA NECK W CONTRAST   Preliminary Result   No high-grade stenosis or focal occlusion involving the intracranial or   cervical vasculature. No evidence of acute dissection. Moderate stenosis of the right posterior cerebral artery at the junction of   P1 and P2 segments of the right posterior cerebral artery. 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   No subarachnoid chronic microvascular   ischemia      Findings were discussed with BRITTON Ocampo at 12:58 pm on 5/29/2019. 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 CT head negative for acute process. Stroke team called from ED w/out recs for tPA. MRI w/ severe chronic small vessel changes but no evidence of acute infarct. ASA ordered. Neurology consulted and appreciated      HTN - w/out known CAD and no evidence of active signs/sxs of ischemia/failure. Currently controlled on home meds w/ vitals reviewed and documented as above.     Afib - rate controlled on Rythmol/BBlocker - continued. Not currently anticoagulated.      Azotemia - w/ elevated BUN/Cr ratio c/w pre-renal azotemia. Given gentle IVF hydration and follow serial labs - stable.   Reviewed and documented as above.         DVT Prophylaxis: LMWH  Diet: DIET GENERAL;  Code Status: Full Code       PT/OT Eval Status: seen w/ recs for SNF.      Dispo - Medically ready for SNF Sat 1 June.          Jayna Zamudio MD

## 2019-06-01 NOTE — CARE COORDINATION
CASE MANAGEMENT DISCHARGE SUMMARY      Discharge to: [facility]    Precertification completed: [place] medicare stay met  Hospital Exemption Notification (HENS) completed: yes    New Durable Medical Equipment ordered/agency: per facility    Transportation:    Family/car: no   Medical Transport explained to pt/family. Pt/family voice no agency preference. Agency used: [facility]   time: Kevyn Nicole 83 form completed: Yes    Notified:    Family: yes   Facility/Agency: [facility] faxed   RN: yes   Phone number for report to facility: [phone]    Note: Discharging nurse to complete [facility], reconcile [facility], and place final copy with patient's discharge packet. RN to ensure that written prescriptions for  Level II medications are sent with patient to the facility as per protocol.

## 2019-06-03 NOTE — CARE COORDINATION
[facility] 6/3 0900  Writer aware pt has DC'ed to SNF. Writer made CRE with [facility] aware of pt's dispo so CRE can f/u with pt during stay at SNF, should [facility] be needed.      Jamilah Moreno RN CTN with Marce Alcaraz [address]  FSI:[phone]

## 2019-06-03 NOTE — DISCHARGE SUMMARY
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 Hospital Course:        TIA - concern for possible CVA on admission, w/ aphasia - ruled out, of unclear etiology.  CT head negative for acute process.  Stroke team called from ED w/out recs for tPA. [facility] w/ severe chronic small vessel changes but no evidence of acute infarct.  ASA ordered. Neurology consulted and appreciated      HTN - w/out known CAD and no evidence of active signs/sxs of ischemia/failure. Currently controlled on home meds     Afib - rate controlled on Rythmol/BBlocker - continued.  Not currently anticoagulated.      Azotemia - w/ elevated BUN/Cr ratio c/w pre-renal azotemia. Given gentle IVF hydration and followed serial labs - stable.          Labs:  For convenience and continuity at follow-up the following most recent labs are provided:      CBC:    Lab Results   Component Value Date    WBC 6.0 05/30/2019    HGB 10.8 05/30/2019    HCT 31.8 05/30/2019     05/30/2019       Renal:    Lab Results   Component Value Date     05/30/2019    K 4.6 05/30/2019    K 4.5 04/12/2018     05/30/2019    CO2 24 05/30/2019    BUN 24 05/30/2019    CREATININE 1.1 05/30/2019    CALCIUM 9.2 05/30/2019    PHOS 3.9 05/30/2019         Significant Diagnostic Studies    Radiology:   MRI BRAIN WO CONTRAST   Final Result   Moderate generalized involutional changes and moderate severe chronic small   vessel ischemic changes. No evidence of a acute infarct, midline shift or   mass effect. XR CHEST PORTABLE   Final Result   No active cardiopulmonary disease         CTA HEAD W CONTRAST   Final Result   1. No high-grade stenosis or focal occlusion involving intracranial or   cervical vasculature. No evidence of acute dissection. 2. Moderate stenosis of right posterior cerebral artery at junction of P1 and   P2 segments of right posterior cerebral artery. 3. A 2 mm aneurysm arising from junction of the right A1 and A2 segments of   right anterior cerebral artery, in the region of anterior communicating   artery. No subarachnoid hemorrhage was identified on CT examination. 4. Chronic appearing moderate compression deformity of T6 and T7 vertebral   bodies. Correlate with recent history of trauma and concern for traumatic   findings. 5. Nonspecific 8 mm nodular pulmonary opacity within right lung apex. Although this may represent area of scarring, pulmonary nodule is not   excluded. Follow-up chest CT may be obtained as clinically warranted in this   age group. 6. Multiple thyroid nodules measuring 18 mm. Please see recommendations   below. RECOMMENDATIONS:   Managing Incidental Thyroid Nodule Detected at CT or MRI or US      1. Further evaluation by thyroid Ultrasound recommended for these incidental   nodules:      Patient Age 25 years or less - Nodule of any size      Patient Age 21-27 years old - Nodule 1 cm in size or greater      Patient Age 28 years or more - Nodule 1.5 cm in size or greater      2.  Follow up thyroid ultrasound also recommend in these scenarios      - Solitary nodule with high risk imaging features (locally invasive nodule or   suspicious lymph nodes)      - Heterogeneous, enlarged thyroid gland.      - Increased uptake on PET      3. No further imaging is recommended in the following scenarios      - Any nodule not meeting above criteria. - Those patients with limited life expectancy or significant      Co-morbidities. Note: These recommendations do not apply to pts. w/ increased risk      for thyroid cancer or pts. with symptomatic thyroid disease.      ________________________________________________________________      Recommendations for f/u of Incidental Thyroid Nodules (ITN)      found on CT, MR, NM and Extrathyroidal US are based upon the ACR      white paper and [facility] 3-tiered system for managing ITNs:      J Am Liliana Radiol. 2015 Feb;12(2): 143-50         CTA NECK W CONTRAST   Final Result   1. No high-grade stenosis or focal occlusion involving intracranial or   cervical vasculature. No evidence of acute dissection. 2. Moderate stenosis of right posterior cerebral artery at junction of P1 and   P2 segments of right posterior cerebral artery. 3. A 2 mm aneurysm arising from junction of the right A1 and A2 segments of   right anterior cerebral artery, in the region of anterior communicating   artery. No subarachnoid hemorrhage was identified on CT examination. 4. Chronic appearing moderate compression deformity of T6 and T7 vertebral   bodies. Correlate with recent history of trauma and concern for traumatic   findings. 5. Nonspecific 8 mm nodular pulmonary opacity within right lung apex. Although this may represent area of scarring, pulmonary nodule is not   excluded. Follow-up chest CT may be obtained as clinically warranted in this   age group. 6. Multiple thyroid nodules measuring 18 mm. Please see recommendations   below. RECOMMENDATIONS:   Managing Incidental Thyroid Nodule Detected at CT or MRI or US      1.   Further evaluation by thyroid Ultrasound recommended for these incidental   nodules:      Patient Age 25 years or less - Nodule of any size acetaminophen (TYLENOL) 500 MG tablet Take 500 mg by mouthHistorical Med      gabapentin (NEURONTIN) 100 MG capsule Historical Med      hydroxyurea (HYDREA) 500 MG chemo capsule Historical Med      Multiple Vitamin (MULTIVITAMIN) tablet Take 1 tablet by mouthHistorical Med      simvastatin (ZOCOR) 20 MG tablet Take 1 tablet by mouth nightly, Disp-30 tablet, R-3Print      metoprolol succinate (TOPROL XL) 25 MG extended release tablet Take 1 tablet by mouth daily, Disp-30 tablet, R-3Print      amLODIPine (NORVASC) 2.5 MG tablet Take 1 tablet by mouth nightly, Disp-30 tablet, R-3Print      propafenone (RYTHMOL SR) 225 MG extended release capsule Take 1 capsule by mouth every 8 hours, Disp-60 capsule, R-3Print      saline nasal gel (AYR) GEL by Nasal route as needed for Congestion, Nasal, PRN, Until Discontinued, Historical Med      timolol (TIMOPTIC-XE) 0.5 % ophthalmic gel-forming Place 1 drop into both eyes daily      aspirin 81 MG EC tablet Take 81 mg by mouth daily. Time Spent on discharge is more than 30 minutes in the examination, evaluation, counseling and review of medications and discharge plan. Signed:    Carlito Israel MD   6/3/2019      Thank you Tang Chavez MD for the opportunity to be involved in this patient's care. If you have any questions or concerns please feel free to contact me at [phone].

## 2019-10-02 ENCOUNTER — HOSPITAL ENCOUNTER (EMERGENCY)
Age: 84
Discharge: HOME | End: 2019-10-02
Payer: MEDICARE

## 2019-10-02 VITALS
SYSTOLIC BLOOD PRESSURE: 158 MMHG | WEIGHT: 123.4 LBS | DIASTOLIC BLOOD PRESSURE: 65 MMHG | RESPIRATION RATE: 18 BRPM | BODY MASS INDEX: 20.56 KG/M2 | TEMPERATURE: 97.3 F | OXYGEN SATURATION: 94 % | HEART RATE: 76 BPM | HEIGHT: 65 IN

## 2019-10-02 DIAGNOSIS — R19.7 DIARRHEA, UNSPECIFIED TYPE: Primary | ICD-10-CM

## 2019-10-02 DIAGNOSIS — E86.0 DEHYDRATION: ICD-10-CM

## 2019-10-02 LAB
A/G RATIO: 1.4 (ref 1.1–2.2)
ALBUMIN SERPL-MCNC: 4.3 G/DL (ref 3.4–5)
ALP BLD-CCNC: 55 U/L (ref 40–129)
ALT SERPL-CCNC: 8 U/L (ref 10–40)
ANION GAP SERPL CALCULATED.3IONS-SCNC: 11 MMOL/L (ref 3–16)
AST SERPL-CCNC: 20 U/L (ref 15–37)
BASOPHILS ABSOLUTE: 0 K/UL (ref 0–0.2)
BASOPHILS RELATIVE PERCENT: 0.3 %
BILIRUB SERPL-MCNC: 0.4 MG/DL (ref 0–1)
BUN BLDV-MCNC: 36 MG/DL (ref 7–20)
CALCIUM SERPL-MCNC: 9.2 MG/DL (ref 8.3–10.6)
CHLORIDE BLD-SCNC: 105 MMOL/L (ref 99–110)
CO2: 21 MMOL/L (ref 21–32)
CREAT SERPL-MCNC: 1.3 MG/DL (ref 0.6–1.2)
EOSINOPHILS ABSOLUTE: 0.1 K/UL (ref 0–0.6)
EOSINOPHILS RELATIVE PERCENT: 0.7 %
GFR AFRICAN AMERICAN: 46
GFR NON-AFRICAN AMERICAN: 38
GLOBULIN: 3.1 G/DL
GLUCOSE BLD-MCNC: 122 MG/DL (ref 70–99)
HCT VFR BLD CALC: 32.4 % (ref 36–48)
HEMATOLOGY PATH CONSULT: YES
HEMOGLOBIN: 10.9 G/DL (ref 12–16)
LYMPHOCYTES ABSOLUTE: 1.1 K/UL (ref 1–5.1)
LYMPHOCYTES RELATIVE PERCENT: 12.3 %
MAGNESIUM: 2.3 MG/DL (ref 1.8–2.4)
MCH RBC QN AUTO: 40.6 PG (ref 26–34)
MCHC RBC AUTO-ENTMCNC: 33.5 G/DL (ref 31–36)
MCV RBC AUTO: 121 FL (ref 80–100)
MONOCYTES ABSOLUTE: 0.5 K/UL (ref 0–1.3)
MONOCYTES RELATIVE PERCENT: 5.7 %
NEUTROPHILS ABSOLUTE: 7.3 K/UL (ref 1.7–7.7)
NEUTROPHILS RELATIVE PERCENT: 81 %
PDW BLD-RTO: 14.8 % (ref 12.4–15.4)
PLATELET # BLD: 293 K/UL (ref 135–450)
PMV BLD AUTO: 6.9 FL (ref 5–10.5)
POTASSIUM REFLEX MAGNESIUM: 4.5 MMOL/L (ref 3.5–5.1)
RBC # BLD: 2.68 M/UL (ref 4–5.2)
SLIDE REVIEW: ABNORMAL
SODIUM BLD-SCNC: 137 MMOL/L (ref 136–145)
TOTAL PROTEIN: 7.4 G/DL (ref 6.4–8.2)
WBC # BLD: 9 K/UL (ref 4–11)

## 2019-10-02 PROCEDURE — 2580000003 HC RX 258: Performed by: NURSE PRACTITIONER

## 2019-10-02 PROCEDURE — 83735 ASSAY OF MAGNESIUM: CPT

## 2019-10-02 PROCEDURE — 99284 EMERGENCY DEPT VISIT MOD MDM: CPT

## 2019-10-02 PROCEDURE — 80053 COMPREHEN METABOLIC PANEL: CPT

## 2019-10-02 PROCEDURE — 51701 INSERT BLADDER CATHETER: CPT

## 2019-10-02 PROCEDURE — 85025 COMPLETE CBC W/AUTO DIFF WBC: CPT

## 2019-10-02 PROCEDURE — 96360 HYDRATION IV INFUSION INIT: CPT

## 2019-10-02 RX ORDER — 0.9 % SODIUM CHLORIDE 0.9 %
1000 INTRAVENOUS SOLUTION INTRAVENOUS ONCE
Status: COMPLETED | OUTPATIENT
Start: 2019-10-02 | End: 2019-10-02

## 2019-10-02 RX ADMIN — SODIUM CHLORIDE 1000 ML: 9 INJECTION, SOLUTION INTRAVENOUS at 21:08

## 2019-10-03 LAB — HEMATOLOGY PATH CONSULT: NORMAL

## 2020-04-22 ENCOUNTER — HOSPITAL ENCOUNTER (EMERGENCY)
Age: 85
Discharge: HOME | End: 2020-04-22
Attending: EMERGENCY MEDICINE
Payer: MEDICARE

## 2020-04-22 ENCOUNTER — APPOINTMENT (OUTPATIENT)
Dept: CT IMAGING | Age: 85
End: 2020-04-22
Payer: MEDICARE

## 2020-04-22 ENCOUNTER — APPOINTMENT (OUTPATIENT)
Dept: GENERAL RADIOLOGY | Age: 85
End: 2020-04-22
Payer: MEDICARE

## 2020-04-22 VITALS
HEART RATE: 61 BPM | WEIGHT: 119 LBS | TEMPERATURE: 98.1 F | SYSTOLIC BLOOD PRESSURE: 159 MMHG | BODY MASS INDEX: 19.83 KG/M2 | OXYGEN SATURATION: 91 % | HEIGHT: 65 IN | RESPIRATION RATE: 16 BRPM | DIASTOLIC BLOOD PRESSURE: 68 MMHG

## 2020-04-22 PROCEDURE — 71250 CT THORAX DX C-: CPT

## 2020-04-22 PROCEDURE — 99284 EMERGENCY DEPT VISIT MOD MDM: CPT

## 2020-04-22 PROCEDURE — 71101 X-RAY EXAM UNILAT RIBS/CHEST: CPT

## 2020-04-22 PROCEDURE — 6370000000 HC RX 637 (ALT 250 FOR IP): Performed by: EMERGENCY MEDICINE

## 2020-04-22 RX ORDER — TRAMADOL HYDROCHLORIDE 50 MG/1
50 TABLET ORAL ONCE
Status: COMPLETED | OUTPATIENT
Start: 2020-04-22 | End: 2020-04-22

## 2020-04-22 RX ORDER — OXYCODONE HYDROCHLORIDE AND ACETAMINOPHEN 5; 325 MG/1; MG/1
1 TABLET ORAL ONCE
Status: COMPLETED | OUTPATIENT
Start: 2020-04-22 | End: 2020-04-22

## 2020-04-22 RX ORDER — AZITHROMYCIN 250 MG/1
250 TABLET, FILM COATED ORAL SEE ADMIN INSTRUCTIONS
Qty: 6 TABLET | Refills: 0 | Status: SHIPPED | OUTPATIENT
Start: 2020-04-22 | End: 2020-04-27

## 2020-04-22 RX ORDER — OXYCODONE HYDROCHLORIDE AND ACETAMINOPHEN 5; 325 MG/1; MG/1
1 TABLET ORAL EVERY 8 HOURS PRN
Qty: 6 TABLET | Refills: 0 | Status: SHIPPED | OUTPATIENT
Start: 2020-04-22 | End: 2020-04-24

## 2020-04-22 RX ADMIN — OXYCODONE HYDROCHLORIDE AND ACETAMINOPHEN 1 TABLET: 5; 325 TABLET ORAL at 15:36

## 2020-04-22 RX ADMIN — TRAMADOL HYDROCHLORIDE 50 MG: 50 TABLET, FILM COATED ORAL at 19:21

## 2020-04-22 ASSESSMENT — PAIN DESCRIPTION - LOCATION: LOCATION: RIB CAGE

## 2020-04-22 ASSESSMENT — PAIN DESCRIPTION - ORIENTATION: ORIENTATION: LEFT

## 2020-04-22 ASSESSMENT — PAIN SCALES - GENERAL
PAINLEVEL_OUTOF10: 10
PAINLEVEL_OUTOF10: 10
PAINLEVEL_OUTOF10: 7
PAINLEVEL_OUTOF10: 8

## 2020-04-22 ASSESSMENT — PAIN DESCRIPTION - DESCRIPTORS: DESCRIPTORS: ACHING;SHARP

## 2020-04-22 ASSESSMENT — PAIN DESCRIPTION - PAIN TYPE: TYPE: ACUTE PAIN

## 2020-04-22 NOTE — ED NOTES
Spoke to patients daughter Eloise Gonzalez with patient permission and updated daughter on patient status. Per Eloise Gonzalez, patient lives at an assisted living facility where she will not be able to receive any assistance for care if she is discharged. \"The facility she lives in will only bring her meals to her\".       Cinda Nichole RN  04/22/20 5635

## 2020-04-22 NOTE — ED PROVIDER NOTES
[facility]  ED  EMERGENCYDEPARTMENT ENCOUNTER      Pt Name: Aaron Hayward  MRN: [id number]  Rivas 2/14/1924  Date of evaluation: 4/22/2020  Kristin Benitez MD    [address] [place] [address]       Chief Complaint   Patient presents with    Fall     Sunday getting up off the toilet fell from the standing position. c/o left rib pain.  Rib Pain         HISTORY OF PRESENT ILLNESS   (Location/Symptom, Timing/Onset,Context/Setting, Quality, Duration, Modifying Factors, Severity)  Note limiting factors. Aaron Hayward is a 80 y.o. female with history of A. Fib (not anticoagulated), hypertension, TIA, hyperlipidemia who presents to the emergency department sp fall. Patient states on Sunday she went to the bathroom (had a BM - diarrhea) and when she got up from the toilet she 'felt everything was draining out of my head' and next thing she knows she woke up on the floor. States she was not able to get up from the floor, scooted over to where she was able to get to a phone and someone came to help her into bed. Since then she has been having pain to her left ribs, not helped with pain medications. Denies cp, sob, cough, fever, n/v.   -No further episodes of diarrhea since Sunday. HPI    Nursing Notes were reviewed. REVIEW OF SYSTEMS    (2-9 systems for level 4, 10 or more for level 5)     Review of Systems   Constitutional: Negative for activity change, appetite change, diaphoresis and fever. Respiratory: Negative for cough, chest tightness and shortness of breath. Cardiovascular: Negative for chest pain and palpitations. Gastrointestinal: Negative for abdominal pain, diarrhea, nausea and vomiting. Genitourinary: Negative for dysuria and flank pain. Skin: Negative for rash and wound. Neurological: Negative for dizziness, weakness, light-headedness, numbness and headaches. Except as noted above the remainder of the review of systems was reviewedand negative.        PAST MEDICAL HISTORY findings:      Interpretation per the Radiologist below, if available at the time of this note:    CT CHEST WO CONTRAST   Final Result   1. Acute left 9th through 11th rib fractures. Small left pleural effusion. No pneumothorax. 2. Minimal nodular infiltrate in the right upper lobe may reflect a mild   inflammatory or infectious process. This may be chronic. XR RIBS LEFT INCLUDE CHEST (MIN 3 VIEWS)   Final Result   No significant findings in the chest.               ED BEDSIDE ULTRASOUND:   Performed by ED Physician - none    LABS:  Labs Reviewed - No data to display    All other labs were within normal range or not returned as of this dictation. EMERGENCY DEPARTMENT COURSE and DIFFERENTIAL DIAGNOSIS/MDM:   Vitals:    Vitals:    04/22/20 1526 04/22/20 1635 04/22/20 1754 04/22/20 1831   BP: (!) 158/61 (!) 148/58 138/68 (!) 159/68   Pulse: 80 74 76 61   Resp: 17 17 18 16   Temp:    98.1 °F (36.7 °C)   TempSrc:    Oral   SpO2: 96% 97% 99% 91%   Weight:       Height:             MDM    ED COURSE/MDM    -Vonnie Oliver is a 80 y.o. female with a history of hypertension, paroxysmal A. fib, dyslipidemia presents to ED for left rib pain. Patient lives in independent home by herself, states she fell getting up from the toilet after bowel movement and passed out. Since then she has been having left rib pain.  -Chest x-ray shows no significant findings in the chest.  CT chest shows acute left ninth through 11th rib fractures. Small left pleural effusion. No pneumothorax. Minimal nodular infiltrate in the right upper lobe may reflect a mild inflammatory infectious process. This may be chronic.  -Was given Percocet which improved the pain however returned and then was given tramadol. Patient was 94 to 98% on room air. Nurse ambulated patient and she was able to do so with no issues. Discussed results with patient, including rib fractures.   Discussed possibly discharged with incentive spirometer and pain medication. Patient states she felt stable and comfortable being discharged and states that she did but that her daughter needed to pick her up. Daughter had called earlier and let the nurse know that she was not sure about discharge as patient lives in independent living. Called her back however was not able to reach the daughter and left a message. Never received a call back.   -As patient comfortable with being discharged, will do so pain medications for left rib fractures. We will also discharge with azithromycin due to possible infiltrate on chest x-ray but also as a prophylaxis for possible development of pneumonia. Patient agrees with plan and has no further questions or concerns at this time. REASSESSMENT      Well appearing, non toxic, alert, oriented speaking in full sentences and hemodynamically stable upon discharge      CRITICAL CARE TIME   Total Critical Care time was 0 minutes, excluding separately reportableprocedures. There was a high probability of clinicallysignificant/life threatening deterioration in the patient's condition which required my urgent intervention. CONSULTS:  None    PROCEDURES:  Unless otherwise noted below, none     Procedures    FINAL IMPRESSION      1. Closed fracture of multiple ribs of left side, initial encounter          DISPOSITION/PLAN   DISPOSITION Decision To Discharge 04/22/2020 07:44:38 PM      PATIENT REFERREDTO:  No follow-up provider specified. DISCHARGE MEDICATIONS:  Discharge Medication List as of 4/22/2020  5:24 PM      START taking these medications    Details   azithromycin (ZITHROMAX) 250 MG tablet Take 1 tablet by mouth See Admin Instructions for 5 days 500mg on day 1 followed by 250mg on days 2 - 5, Disp-6 tablet, R-0Print      oxyCODONE-acetaminophen (PERCOCET) 5-325 MG per tablet Take 1 tablet by mouth every 8 hours as needed for Pain for up to 2 days. Intended supply: 3 days.  Take lowest dose possible to manage pain, Disp-6 tablet,

## 2020-04-22 NOTE — ED NOTES

## 2020-05-06 ASSESSMENT — ENCOUNTER SYMPTOMS
DIARRHEA: 0
CHEST TIGHTNESS: 0
COUGH: 0
VOMITING: 0
SHORTNESS OF BREATH: 0
NAUSEA: 0
ABDOMINAL PAIN: 0

## 2021-01-04 ENCOUNTER — APPOINTMENT (OUTPATIENT)
Dept: GENERAL RADIOLOGY | Age: 86
DRG: 312 | End: 2021-01-04
Payer: MEDICARE

## 2021-01-04 ENCOUNTER — APPOINTMENT (OUTPATIENT)
Dept: CT IMAGING | Age: 86
DRG: 312 | End: 2021-01-04
Payer: MEDICARE

## 2021-01-04 ENCOUNTER — HOSPITAL ENCOUNTER (INPATIENT)
Age: 86
LOS: 2 days | Discharge: NURSING, REHAB OR LONG-TERM CARE | DRG: 312 | End: 2021-01-08
Attending: EMERGENCY MEDICINE | Admitting: INTERNAL MEDICINE
Payer: MEDICARE

## 2021-01-04 DIAGNOSIS — I95.1 ORTHOSTASIS: ICD-10-CM

## 2021-01-04 DIAGNOSIS — R07.81 RIB PAIN ON LEFT SIDE: ICD-10-CM

## 2021-01-04 DIAGNOSIS — R55 SYNCOPE AND COLLAPSE: Primary | ICD-10-CM

## 2021-01-04 LAB
A/G RATIO: 1.5 (ref 1.1–2.2)
ALBUMIN SERPL-MCNC: 4 G/DL (ref 3.4–5)
ALP BLD-CCNC: 49 U/L (ref 40–129)
ALT SERPL-CCNC: <5 U/L (ref 10–40)
ANION GAP SERPL CALCULATED.3IONS-SCNC: 8 MMOL/L (ref 3–16)
AST SERPL-CCNC: 19 U/L (ref 15–37)
BASOPHILS ABSOLUTE: 0 K/UL (ref 0–0.2)
BASOPHILS RELATIVE PERCENT: 0.4 %
BILIRUB SERPL-MCNC: 0.6 MG/DL (ref 0–1)
BILIRUBIN URINE: NEGATIVE
BLOOD, URINE: NEGATIVE
BUN BLDV-MCNC: 15 MG/DL (ref 7–20)
CALCIUM SERPL-MCNC: 8.7 MG/DL (ref 8.3–10.6)
CHLORIDE BLD-SCNC: 105 MMOL/L (ref 99–110)
CLARITY: CLEAR
CO2: 25 MMOL/L (ref 21–32)
COLOR: YELLOW
CREAT SERPL-MCNC: 1.1 MG/DL (ref 0.6–1.2)
EKG ATRIAL RATE: 50 BPM
EKG DIAGNOSIS: NORMAL
EKG P AXIS: 38 DEGREES
EKG P-R INTERVAL: 212 MS
EKG Q-T INTERVAL: 458 MS
EKG QRS DURATION: 102 MS
EKG QTC CALCULATION (BAZETT): 417 MS
EKG R AXIS: -1 DEGREES
EKG T AXIS: 53 DEGREES
EKG VENTRICULAR RATE: 50 BPM
EOSINOPHILS ABSOLUTE: 0.1 K/UL (ref 0–0.6)
EOSINOPHILS RELATIVE PERCENT: 1.2 %
GFR AFRICAN AMERICAN: 56
GFR NON-AFRICAN AMERICAN: 46
GLOBULIN: 2.6 G/DL
GLUCOSE BLD-MCNC: 142 MG/DL (ref 70–99)
GLUCOSE URINE: NEGATIVE MG/DL
HCT VFR BLD CALC: 29.9 % (ref 36–48)
HEMATOLOGY PATH CONSULT: YES
HEMOGLOBIN: 9.9 G/DL (ref 12–16)
KETONES, URINE: NEGATIVE MG/DL
LEUKOCYTE ESTERASE, URINE: NEGATIVE
LYMPHOCYTES ABSOLUTE: 1.7 K/UL (ref 1–5.1)
LYMPHOCYTES RELATIVE PERCENT: 31.5 %
MCH RBC QN AUTO: 43.1 PG (ref 26–34)
MCHC RBC AUTO-ENTMCNC: 33.2 G/DL (ref 31–36)
MCV RBC AUTO: 129.8 FL (ref 80–100)
MICROSCOPIC EXAMINATION: NORMAL
MONOCYTES ABSOLUTE: 0.3 K/UL (ref 0–1.3)
MONOCYTES RELATIVE PERCENT: 6.3 %
NEUTROPHILS ABSOLUTE: 3.3 K/UL (ref 1.7–7.7)
NEUTROPHILS RELATIVE PERCENT: 60.6 %
NITRITE, URINE: NEGATIVE
PDW BLD-RTO: 15.7 % (ref 12.4–15.4)
PH UA: 5.5 (ref 5–8)
PLATELET # BLD: 304 K/UL (ref 135–450)
PMV BLD AUTO: 6.8 FL (ref 5–10.5)
POTASSIUM SERPL-SCNC: 4.1 MMOL/L (ref 3.5–5.1)
PROTEIN UA: NEGATIVE MG/DL
RBC # BLD: 2.3 M/UL (ref 4–5.2)
SARS-COV-2, NAAT: NOT DETECTED
SLIDE REVIEW: ABNORMAL
SODIUM BLD-SCNC: 138 MMOL/L (ref 136–145)
SPECIFIC GRAVITY UA: 1.02 (ref 1–1.03)
TOTAL PROTEIN: 6.6 G/DL (ref 6.4–8.2)
TROPONIN: <0.01 NG/ML
URINE TYPE: NORMAL
UROBILINOGEN, URINE: 0.2 E.U./DL
WBC # BLD: 5.4 K/UL (ref 4–11)

## 2021-01-04 PROCEDURE — 6370000000 HC RX 637 (ALT 250 FOR IP): Performed by: PHYSICIAN ASSISTANT

## 2021-01-04 PROCEDURE — G0378 HOSPITAL OBSERVATION PER HR: HCPCS

## 2021-01-04 PROCEDURE — 73590 X-RAY EXAM OF LOWER LEG: CPT

## 2021-01-04 PROCEDURE — 93005 ELECTROCARDIOGRAM TRACING: CPT | Performed by: EMERGENCY MEDICINE

## 2021-01-04 PROCEDURE — 93010 ELECTROCARDIOGRAM REPORT: CPT | Performed by: INTERNAL MEDICINE

## 2021-01-04 PROCEDURE — 71250 CT THORAX DX C-: CPT

## 2021-01-04 PROCEDURE — 71045 X-RAY EXAM CHEST 1 VIEW: CPT

## 2021-01-04 PROCEDURE — 85025 COMPLETE CBC W/AUTO DIFF WBC: CPT

## 2021-01-04 PROCEDURE — 2580000003 HC RX 258: Performed by: INTERNAL MEDICINE

## 2021-01-04 PROCEDURE — 84484 ASSAY OF TROPONIN QUANT: CPT

## 2021-01-04 PROCEDURE — 73130 X-RAY EXAM OF HAND: CPT

## 2021-01-04 PROCEDURE — 81003 URINALYSIS AUTO W/O SCOPE: CPT

## 2021-01-04 PROCEDURE — 96360 HYDRATION IV INFUSION INIT: CPT

## 2021-01-04 PROCEDURE — 6370000000 HC RX 637 (ALT 250 FOR IP): Performed by: INTERNAL MEDICINE

## 2021-01-04 PROCEDURE — 99285 EMERGENCY DEPT VISIT HI MDM: CPT

## 2021-01-04 PROCEDURE — 80053 COMPREHEN METABOLIC PANEL: CPT

## 2021-01-04 PROCEDURE — 96361 HYDRATE IV INFUSION ADD-ON: CPT

## 2021-01-04 PROCEDURE — 72125 CT NECK SPINE W/O DYE: CPT

## 2021-01-04 PROCEDURE — 2580000003 HC RX 258: Performed by: PHYSICIAN ASSISTANT

## 2021-01-04 PROCEDURE — U0002 COVID-19 LAB TEST NON-CDC: HCPCS

## 2021-01-04 PROCEDURE — 70450 CT HEAD/BRAIN W/O DYE: CPT

## 2021-01-04 RX ORDER — GABAPENTIN 100 MG/1
100 CAPSULE ORAL 2 TIMES DAILY
Status: DISCONTINUED | OUTPATIENT
Start: 2021-01-05 | End: 2021-01-08 | Stop reason: HOSPADM

## 2021-01-04 RX ORDER — PROPAFENONE HYDROCHLORIDE 225 MG/1
225 CAPSULE, EXTENDED RELEASE ORAL EVERY 8 HOURS
Status: DISCONTINUED | OUTPATIENT
Start: 2021-01-04 | End: 2021-01-07

## 2021-01-04 RX ORDER — PROMETHAZINE HYDROCHLORIDE 25 MG/1
12.5 TABLET ORAL EVERY 6 HOURS PRN
Status: DISCONTINUED | OUTPATIENT
Start: 2021-01-04 | End: 2021-01-08 | Stop reason: HOSPADM

## 2021-01-04 RX ORDER — ACETAMINOPHEN 650 MG/1
650 SUPPOSITORY RECTAL EVERY 6 HOURS PRN
Status: DISCONTINUED | OUTPATIENT
Start: 2021-01-04 | End: 2021-01-08 | Stop reason: HOSPADM

## 2021-01-04 RX ORDER — 0.9 % SODIUM CHLORIDE 0.9 %
1000 INTRAVENOUS SOLUTION INTRAVENOUS ONCE
Status: COMPLETED | OUTPATIENT
Start: 2021-01-04 | End: 2021-01-04

## 2021-01-04 RX ORDER — TIMOLOL MALEATE 5 MG/ML
1 SOLUTION/ DROPS OPHTHALMIC DAILY
Status: DISCONTINUED | OUTPATIENT
Start: 2021-01-05 | End: 2021-01-08 | Stop reason: HOSPADM

## 2021-01-04 RX ORDER — ZOLPIDEM TARTRATE 5 MG/1
2.5 TABLET ORAL NIGHTLY
Status: COMPLETED | OUTPATIENT
Start: 2021-01-04 | End: 2021-01-04

## 2021-01-04 RX ORDER — SODIUM CHLORIDE 0.9 % (FLUSH) 0.9 %
10 SYRINGE (ML) INJECTION EVERY 12 HOURS SCHEDULED
Status: DISCONTINUED | OUTPATIENT
Start: 2021-01-04 | End: 2021-01-08 | Stop reason: HOSPADM

## 2021-01-04 RX ORDER — ACETAMINOPHEN 325 MG/1
650 TABLET ORAL EVERY 6 HOURS PRN
Status: DISCONTINUED | OUTPATIENT
Start: 2021-01-04 | End: 2021-01-08 | Stop reason: HOSPADM

## 2021-01-04 RX ORDER — SODIUM CHLORIDE 0.9 % (FLUSH) 0.9 %
10 SYRINGE (ML) INJECTION PRN
Status: DISCONTINUED | OUTPATIENT
Start: 2021-01-04 | End: 2021-01-08 | Stop reason: HOSPADM

## 2021-01-04 RX ORDER — SIMVASTATIN 10 MG
20 TABLET ORAL NIGHTLY
Status: DISCONTINUED | OUTPATIENT
Start: 2021-01-04 | End: 2021-01-04 | Stop reason: CLARIF

## 2021-01-04 RX ORDER — LIDOCAINE 4 G/G
1 PATCH TOPICAL DAILY
Status: DISCONTINUED | OUTPATIENT
Start: 2021-01-04 | End: 2021-01-08 | Stop reason: HOSPADM

## 2021-01-04 RX ORDER — POLYETHYLENE GLYCOL 3350 17 G/17G
17 POWDER, FOR SOLUTION ORAL DAILY PRN
Status: DISCONTINUED | OUTPATIENT
Start: 2021-01-04 | End: 2021-01-08 | Stop reason: HOSPADM

## 2021-01-04 RX ORDER — METOPROLOL SUCCINATE 50 MG/1
25 TABLET, EXTENDED RELEASE ORAL DAILY
Status: DISCONTINUED | OUTPATIENT
Start: 2021-01-05 | End: 2021-01-07

## 2021-01-04 RX ORDER — ONDANSETRON 2 MG/ML
4 INJECTION INTRAMUSCULAR; INTRAVENOUS EVERY 6 HOURS PRN
Status: DISCONTINUED | OUTPATIENT
Start: 2021-01-04 | End: 2021-01-08 | Stop reason: HOSPADM

## 2021-01-04 RX ORDER — ASPIRIN 81 MG/1
81 TABLET ORAL DAILY
Status: DISCONTINUED | OUTPATIENT
Start: 2021-01-05 | End: 2021-01-08 | Stop reason: HOSPADM

## 2021-01-04 RX ORDER — HYDROCODONE BITARTRATE AND ACETAMINOPHEN 5; 325 MG/1; MG/1
1 TABLET ORAL ONCE
Status: COMPLETED | OUTPATIENT
Start: 2021-01-04 | End: 2021-01-04

## 2021-01-04 RX ORDER — DORZOLAMIDE HCL 20 MG/ML
1 SOLUTION/ DROPS OPHTHALMIC 3 TIMES DAILY
Status: DISCONTINUED | OUTPATIENT
Start: 2021-01-05 | End: 2021-01-08 | Stop reason: HOSPADM

## 2021-01-04 RX ORDER — ATORVASTATIN CALCIUM 10 MG/1
10 TABLET, FILM COATED ORAL NIGHTLY
Status: DISCONTINUED | OUTPATIENT
Start: 2021-01-04 | End: 2021-01-08 | Stop reason: HOSPADM

## 2021-01-04 RX ADMIN — ZOLPIDEM TARTRATE 2.5 MG: 5 TABLET ORAL at 23:19

## 2021-01-04 RX ADMIN — SODIUM CHLORIDE, PRESERVATIVE FREE 10 ML: 5 INJECTION INTRAVENOUS at 23:25

## 2021-01-04 RX ADMIN — PROPAFENONE HYDROCHLORIDE 225 MG: 225 CAPSULE, EXTENDED RELEASE ORAL at 23:24

## 2021-01-04 RX ADMIN — HYDROCODONE BITARTRATE AND ACETAMINOPHEN 1 TABLET: 5; 325 TABLET ORAL at 11:51

## 2021-01-04 RX ADMIN — SODIUM CHLORIDE 1000 ML: 9 INJECTION, SOLUTION INTRAVENOUS at 11:52

## 2021-01-04 RX ADMIN — ATORVASTATIN CALCIUM 10 MG: 10 TABLET, FILM COATED ORAL at 23:19

## 2021-01-04 ASSESSMENT — PAIN - FUNCTIONAL ASSESSMENT: PAIN_FUNCTIONAL_ASSESSMENT: ACTIVITIES ARE NOT PREVENTED

## 2021-01-04 ASSESSMENT — PAIN DESCRIPTION - LOCATION
LOCATION: RIB CAGE
LOCATION: RIB CAGE

## 2021-01-04 ASSESSMENT — PAIN DESCRIPTION - PAIN TYPE
TYPE: ACUTE PAIN
TYPE: ACUTE PAIN

## 2021-01-04 ASSESSMENT — PAIN SCALES - GENERAL
PAINLEVEL_OUTOF10: 10
PAINLEVEL_OUTOF10: 1
PAINLEVEL_OUTOF10: 2
PAINLEVEL_OUTOF10: 10

## 2021-01-04 ASSESSMENT — PAIN DESCRIPTION - ORIENTATION
ORIENTATION: LEFT
ORIENTATION: LEFT

## 2021-01-04 NOTE — ACP (ADVANCE CARE PLANNING)
Advance Care Planning     Advance Care Planning Clinical Specialist  Conversation Note      Date of ACP Conversation: 1/4/2021    Conversation Conducted with: Liane Colvin Dtr/MELANY    ACP Clinical Specialist: William Christine. Decision Maker:     Current Designated Health Care Decision Maker:   Primary Decision Maker: Annalee Narayanan Child - [phone]    Care Preferences    Hospitalization: \"If your health worsens and it becomes clear that your chance of recovery is unlikely, what would your preference be regarding hospitalization? \"    Choice:  [x] The patient wants hospitalization  [] The patient prefers comfort-focused treatment without hospitalization. Ventilation: \"If you were in your present state of health and suddenly became very ill and were unable to breathe on your own, what would your preference be about the use of a ventilator (breathing machine) if it were available to you? \"      Would the patient desire the use of ventilator (breathing machine)?: no    \"If your health worsens and it becomes clear that your chance of recovery is unlikely, what would your preference be about the use of a ventilator (breathing machine) if it were available to you? \"     Would the patient desire the use of ventilator (breathing machine)?: No      Resuscitation  \"CPR works best to restart the heart when there is a sudden event, like a heart attack, in someone who is otherwise healthy. Unfortunately, CPR does not typically restart the heart for people who have serious health conditions or who are very sick. \"    \"In the event your heart stopped as a result of an underlying serious health condition, would you want attempts to be made to restart your heart (answer \"yes\" for attempt to resuscitate) or would you prefer a natural death (answer \"no\" for do not attempt to resuscitate)? \" no    Length of ACP Conversation in minutes:  10 minutes     Conversation Outcomes:  [x] ACP discussion completed

## 2021-01-04 NOTE — ED PROVIDER NOTES
I independently performed a history and physical on Sofia Ruvalcaba. All diagnostic, treatment, and disposition decisions were made by myself in conjunction with the advanced practice provider. For further details of [facility] emergency department encounter, please see AGUSTINA Pickering's documentation. Patient reports she had a fall either Saturday or Sunday, she does not recall. She believes she lost consciousness as a part of the fall. She complains of some left rib pain as well as hand and wrist pain. To me she denies any other pain complaints. No shortness of breath. No fevers or vomiting. On exam she has no tenderness along the cervical, thoracic or lumbar regions. She does have some left rib tenderness without ecchymosis or abrasion. No abdominal tenderness. EKG  The Ekg interpreted by me shows  sinus bradycardia, rate=50   Axis is   Normal  QTc is  normal  Intervals and Durations are unremarkable. ST Segments: normal  Bradycardia is new relative to the EKG dated 05/29/2019    Xr Hand Right (min 3 Views)  Negative for fracture     Xr Tibia Fibula Right (2 Views)  Negative     Ct Head Wo Contrast  No traumatic abnormality. Ct Chest Wo Contrast  Remote nondisplaced left posterolateral 10th and rib fractures. No acute rib fracture demonstrated. Stable remote compression fractures in the midthoracic spine also noted. Several new subcentimeter nodules in the right upper lobe most of which are grouped together and are most likely infectious or inflammatory in etiology. Follow-up chest CT in 3 months may be considered. Ct Cervical Spine Wo Contrast  No traumatic abnormality. Xr Chest Portable  No x-ray evidence of acute trauma to the chest or acute cardiopulmonary disease.             uJan Sands MD  01/04/21 [id number]

## 2021-01-04 NOTE — H&P
Hospital Medicine History & Physical      PCP: Gurpreet Mills MD    Date of Admission: 1/4/2021    Date of Service: Pt seen/examined on 1/4/21 and  Placed in Observation. Chief Complaint:  I fell      History Of Present Illness:  (pt is poor historian)    80 y.o. female with dm2, afib, glaucoma who presented to [facility] with [facility]. Pt lives in assisted living at [facility] and apparently had a fall this weekend( she cannot remember which day). She recalls being in the bathroom and having/seeing something and then next finding herself on the ground. She denies sob/cp/abd pain currently. She had dec'd appetite of late. She came in today via EMS.   -Per daughter, pt fell Saturday evening while in the walk-in shower, pulled cord , causing aides to come(who then called EMS due to trouble getting pt up)  -Pt refused to go to hospital at the time  -daugther saw her on Sunday and note pt neded help to get up and yelled out in pain  By today, unable to get up out of bed for meals per aides, so brought in by EMS      Past Medical History:          Diagnosis Date    Atrial fibrillation ([address]     Colon polyps 5/05    Depression     Diabetes mellitus ([address]     Diverticula of colon 5/05    Elbow injury 12/10    fell -injured lt elbow    Esophageal reflux     Glaucoma     Hearing loss     hard of hearing - bilateral hearing aides    Hyperlipidemia     Insomnia     Osteoporosis     PAT (paroxysmal atrial tachycardia) ([address]     Rhinitis, allergic     Wears glasses        Past Surgical History:          Procedure Laterality Date    APPENDECTOMY      CATARACT REMOVAL WITH IMPLANT  1/17/11    right eye    CATARACT REMOVAL WITH IMPLANT  t    cataract removal with lens implant left eye    CHOLECYSTECTOMY      COLONOSCOPY      HYSTERECTOMY      TONSILLECTOMY      age 15       Medications Prior to Admission:      Prior to Admission medications Medication Sig Start Date End Date Taking? Authorizing Provider   dorzolamide (TRUSOPT) 2 % ophthalmic solution Apply to eye    Historical Provider, MD   acetaminophen (TYLENOL) 500 MG tablet Take 500 mg by mouth    Historical Provider, MD   gabapentin (NEURONTIN) 100 MG capsule  3/26/19   Historical Provider, MD   hydroxyurea (HYDREA) 500 MG chemo capsule  5/17/19   Historical Provider, MD   Multiple Vitamin (MULTIVITAMIN) tablet Take 1 tablet by mouth    Historical Provider, MD   simvastatin (ZOCOR) 20 MG tablet Take 1 tablet by mouth nightly 4/13/18   Zoe Hong MD   metoprolol succinate (TOPROL XL) 25 MG extended release tablet Take 1 tablet by mouth daily 4/14/18   Zoe Hong MD   amLODIPine (NORVASC) 2.5 MG tablet Take 1 tablet by mouth nightly  Patient taking differently: Take 5 mg by mouth nightly  4/13/18   Zoe Hong MD   propafenone (RYTHMOL SR) 225 MG extended release capsule Take 1 capsule by mouth every 8 hours 4/13/18   Zoe Hong MD   saline nasal gel (AYR) GEL by Nasal route as needed for Congestion    Historical Provider, MD   timolol (TIMOPTIC-XE) 0.5 % ophthalmic gel-forming Place 1 drop into both eyes daily    Historical Provider, MD   aspirin 81 MG EC tablet Take 81 mg by mouth daily. Historical Provider, MD       Allergies:  Actonel [risedronate sodium] and Amoxicillin    Social History:      The patient currently lives at assisted living    TOBACCO:   reports that she quit smoking about 46 years ago. She has never used smokeless tobacco.  ETOH:   reports current alcohol use. E-Cigarettes/Vaping Use     Questions Responses    E-Cigarette/Vaping Use Never User    Start Date     Passive Exposure     Quit Date     Counseling Given     Comments             Family History:       Reviewed in detail and negative for DM, CAD, Cancer, CVA.  Positive as follows:        Problem Relation Age of Onset    Stroke Father     Diabetes Brother     Diabetes Maternal Aunt REVIEW OF SYSTEMS:   Pertinent positives as noted in the HPI. All other systems reviewed and negative. PHYSICAL EXAM PERFORMED:    BP (!) 141/60   Pulse 53   Temp 97.7 °F (36.5 °C) (Oral)   Resp 16   Ht 5' 5\" (1.651 m)   Wt 114 lb (51.7 kg)   SpO2 99%   BMI 18.97 kg/m²     General appearance:  No apparent distress, appears stated age and cooperative. Frail appearing  HEENT:  Normal cephalic, atraumatic without obvious deformity. Pupils equal, round, and reactive to light. Extra ocular muscles intact. Conjunctivae/corneas clear. [place]  Neck: Supple, with full range of motion. No jugular venous distention. Trachea midline. Respiratory:  Normal respiratory effort. Clear to auscultation, bilaterally without Rales/Wheezes/Rhonchi. Cardiovascular:  Regular rate and rhythm with normal S1/S2 without murmurs, rubs or gallops. Abdomen: Soft, non-tender, non-distended with normal bowel sounds. Musculoskeletal:  No clubbing, cyanosis or edema bilaterally. Full range of motion without deformity. Skin: Skin color, texture, turgor normal.  No rashes or lesions. Neurologic:  Neurovascularly intact without any focal sensory/motor deficits. Cranial nerves: II-XII intact, grossly non-focal.  Psychiatric:  Alert and oriented, thought content appropriate, normal insight  Capillary Refill: Brisk,< 3 seconds   Peripheral Pulses: +2 palpable, equal bilaterally       Labs:     Recent Labs     01/04/21  1020   WBC 5.4   HGB 9.9*   HCT 29.9*        Recent Labs     01/04/21  1020      K 4.1      CO2 25   BUN 15   CREATININE 1.1   CALCIUM 8.7     Recent Labs     01/04/21  1020   AST 19   ALT <5*   BILITOT 0.6   ALKPHOS 49     No results for input(s): INR in the last 72 hours.   Recent Labs     01/04/21  1020   TROPONINI <0.01       Urinalysis:      Lab Results   Component Value Date    NITRU Negative 01/04/2021    BLOODU Negative 01/04/2021    SPECGRAV 1.020 01/04/2021    GLUCOSEU Negative 01/04/2021 Radiology:       EKG:  I have reviewed the EKG with the following interpretation: sinus cesilia, 50, 1st degree av block,     CT HEAD WO CONTRAST   Final Result   No traumatic abnormality. CT CERVICAL SPINE WO CONTRAST   Final Result   No traumatic abnormality. CT CHEST WO CONTRAST   Final Result   Remote nondisplaced left posterolateral 10th and rib fractures. No acute rib   fracture demonstrated. Stable remote compression fractures in the   midthoracic spine also noted. Several new subcentimeter nodules in the right upper lobe most of which are   grouped together and are most likely infectious or inflammatory in etiology. Follow-up chest CT in 3 months may be considered. XR HAND RIGHT (MIN 3 VIEWS)   Final Result   Negative for fracture         XR TIBIA FIBULA RIGHT (2 VIEWS)   Final Result   Negative         XR CHEST PORTABLE   Final Result   No x-ray evidence of acute trauma to the chest or acute cardiopulmonary   disease.              ASSESSMENT:    Active Hospital Problems    Diagnosis Date Noted    Syncope and collapse [R55] 01/04/2021         PLAN:    Syncope- unclear etiology, ekg noted sinus cesilia, also with + orthostatic hypotension, Ct head/neck were unremarkable  -check carotid u/s, echo  -tele  -ivfs given  -Check am orthostatics    Chest pain- likely from prior Rib fx(noted on CT however remote) or msk strain  -supportive care    Afib- per emr, sinus on ekg on admission  -on Asa,bb, rythmol      Dm2- per emr  -ac/hs bs    Glaucoma- continued timolol, trusport    htn- held home norvasc given above      DVT Prophylaxis: lovenox  Diet: General  Code Status: DNR-cc(ER nurse spoke to and admitting MD confirmed with daughter(and POA) Sabas Pate)  PT/OT Eval Status: ordered    Dispo - pending workup       Suzy Neumann MD Thank you Bruno Carreno MD for the opportunity to be involved in this patient's care. If you have any questions or concerns please feel free to contact me at [phone].

## 2021-01-04 NOTE — ED NOTES

## 2021-01-04 NOTE — ED PROVIDER NOTES
[address] [facility]    CHIEF COMPLAINT  Loss of Consciousness (pt states she thinks she passed out and fell Saturday. C/o rib pain today), Fall, and Rib Injury      SHARED SERVICE VISIT  I have seen and evaluated this patient with my supervising physician, Dr. Andrade Mujica. HISTORY OF PRESENT ILLNESS  Michael Salguero is a 80 y.o. female who presents to the ED complaining of left rib pain status post fall. Patient brought in by squad. Does not appear to be best historian. From assisted living facility where she reports falling 2 days ago. She reports that she leaked believes she passed out. She is not sure whether she hit head. She denies any headache, dizziness or confusion. No visual changes disturbances. She denies neck or back pain. She reports left-sided rib pain worse with movement. She denies associated chest pain otherwise as well as no shortness of breath. She has had no urinary symptoms. Reports she has had decreased appetite for past several weeks. No nausea or vomiting. No diarrhea or constipation. No other complaints, modifying factors or associated symptoms. Nursing notes reviewed.    Past Medical History:   Diagnosis Date    Atrial fibrillation ([address]     Colon polyps 5/05    Depression     Diabetes mellitus ([address]     Diverticula of colon 5/05    Elbow injury 12/10    fell -injured lt elbow    Esophageal reflux     Glaucoma     Hearing loss     hard of hearing - bilateral hearing aides    Hyperlipidemia     Insomnia     Osteoporosis     PAT (paroxysmal atrial tachycardia) (HCC)     Rhinitis, allergic     Wears glasses      Past Surgical History:   Procedure Laterality Date    APPENDECTOMY      CATARACT REMOVAL WITH IMPLANT  1/17/11    right eye    CATARACT REMOVAL WITH IMPLANT  t    cataract removal with lens implant left eye    CHOLECYSTECTOMY      COLONOSCOPY      HYSTERECTOMY      TONSILLECTOMY      age 15 Family History   Problem Relation Age of Onset    Stroke Father     Diabetes Brother     Diabetes Maternal Aunt      Social History     Socioeconomic History    Marital status:       Spouse name: Not on file    Number of children: Not on file    Years of education: Not on file    Highest education level: Not on file   Occupational History    Not on file   Social Needs    Financial resource strain: Not on file    Food insecurity     Worry: Not on file     Inability: Not on file    Transportation needs     Medical: Not on file     Non-medical: Not on file   Tobacco Use    Smoking status: Former Smoker     Quit date: 1975     Years since quittin.0    Smokeless tobacco: Never Used   Substance and Sexual Activity    Alcohol use: Yes     Comment: occasionally    Drug use: No    Sexual activity: Not on file   Lifestyle    Physical activity     Days per week: Not on file     Minutes per session: Not on file    Stress: Not on file   Relationships    Social connections     Talks on phone: Not on file     Gets together: Not on file     Attends Oriental orthodox service: Not on file     Active member of club or organization: Not on file     Attends meetings of clubs or organizations: Not on file     Relationship status: Not on file    Intimate partner violence     Fear of current or ex partner: Not on file     Emotionally abused: Not on file     Physically abused: Not on file     Forced sexual activity: Not on file   Other Topics Concern    Not on file   Social History Narrative    Not on file     Current Facility-Administered Medications   Medication Dose Route Frequency Provider Last Rate Last Admin    0.9 % sodium chloride bolus  1,000 mL Intravenous Once [facility]         Current Outpatient Medications   Medication Sig Dispense Refill    dorzolamide (TRUSOPT) 2 % ophthalmic solution Apply to eye      acetaminophen (TYLENOL) 500 MG tablet Take 500 mg by mouth  gabapentin (NEURONTIN) 100 MG capsule       hydroxyurea (HYDREA) 500 MG chemo capsule       Multiple Vitamin (MULTIVITAMIN) tablet Take 1 tablet by mouth      simvastatin (ZOCOR) 20 MG tablet Take 1 tablet by mouth nightly 30 tablet 3    metoprolol succinate (TOPROL XL) 25 MG extended release tablet Take 1 tablet by mouth daily 30 tablet 3    amLODIPine (NORVASC) 2.5 MG tablet Take 1 tablet by mouth nightly (Patient taking differently: Take 5 mg by mouth nightly ) 30 tablet 3    propafenone (RYTHMOL SR) 225 MG extended release capsule Take 1 capsule by mouth every 8 hours 60 capsule 3    saline nasal gel (AYR) GEL by Nasal route as needed for Congestion      timolol (TIMOPTIC-XE) 0.5 % ophthalmic gel-forming Place 1 drop into both eyes daily      aspirin 81 MG EC tablet Take 81 mg by mouth daily. Allergies   Allergen Reactions    Actonel [Risedronate Sodium] Other (See Comments)     actonel causes GI upset     Amoxicillin Hives       REVIEW OF SYSTEMS  10 systems reviewed, pertinent positives per HPI otherwise noted to be negative    PHYSICAL EXAM  BP (!) 110/51   Pulse 63   Temp 97.7 °F (36.5 °C) (Oral)   Resp 16   Ht 5' 5\" (1.651 m)   Wt 114 lb (51.7 kg)   SpO2 98%   BMI 18.97 kg/m²   GENERAL APPEARANCE: Awake and alert. Cooperative. No acute distress. HEAD: Normocephalic. Atraumatic. EYES: PERRL. EOM's grossly intact. ENT: Mucous membranes are moist.   NECK: Supple. No JVD. No midline bony tenderness. No crepitus or deformities. HEART: RRR. No murmurs. Right-sided chest wall tenderness. No paradoxical motion. No subcutaneous emphysema. LUNGS: Respirations unlabored. CTAB. Good air exchange. Speaking comfortably in full sentences. No wheezing, rhonchi, rales. No dullness or hyperresonance to percussion. ABDOMEN: Soft. Non-distended. Non-tender. No guarding or rebound. No midline pulsatile mass. Negative Mckeon's, McBurney's and Rovsing's. No fluid waves or ascites. No hernias or masses. Bowel sounds normal in all quadrants. No CVA tenderness. EXTREMITIES: No peripheral edema. Patient with contusion and associated tenderness to right thumb. No obvious deformity or step-off. No evidence of dislocations or subluxations. No loss of thumbfinger opposition. No snuffbox tenderness. Radial pulses +2 and cap refill less than 5 seconds. No reproducible pain to upper extremities otherwise. No pelvic pain or instability. Mild abrasion with tenderness to anterior right tib/fib. No deformities or step-offs. Compartment soft without crepitus. No unilateral calf pain, redness or swelling. Moves all extremities equally. All extremities neurovascularly intact. SKIN: Warm and dry. No acute rashes. NEUROLOGICAL: Alert and oriented. CN's 2-12 intact. No gross facial drooping. Strength 5/5, sensation intact. PSYCHIATRIC: Normal mood and affect. RADIOLOGY  Xr Hand Right (min 3 Views)    Result Date: 1/4/2021  EXAMINATION: THREE XRAY VIEWS OF THE RIGHT HAND 1/4/2021 10:44 am COMPARISON: None. HISTORY: ORDERING SYSTEM PROVIDED HISTORY: fall TECHNOLOGIST PROVIDED HISTORY: Reason for exam:->fall Reason for Exam: FALL; RIGHT HAND BRUISING Acuity: Acute Type of Exam: Initial FINDINGS: The bones are osteopenic. There is no evidence of fracture or dislocation.  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 HISTORY: ORDERING SYSTEM PROVIDED HISTORY: fall TECHNOLOGIST PROVIDED HISTORY: Reason for exam:->fall Reason for Exam: FALL Acuity: Acute Type of Exam: Initial FINDINGS: No evidence of fracture or dislocation     Negative     Ct Head Wo Contrast    Result Date: 1/4/2021  EXAMINATION: CT OF THE HEAD WITHOUT CONTRAST; CT OF THE CERVICAL SPINE WITHOUT CONTRAST 1/4/2021 10:42 am TECHNIQUE: CT of the head was performed without the administration of intravenous contrast. Dose modulation, iterative reconstruction, and/or weight based adjustment of the mA/kV was utilized to reduce the radiation dose to as low as reasonably achievable.; CT of the cervical spine was performed without the administration of intravenous contrast. Multiplanar reformatted images are provided for review. Dose modulation, iterative reconstruction, and/or weight based adjustment of the mA/kV was utilized to reduce the radiation dose to as low as reasonably achievable. COMPARISON: None. HISTORY: ORDERING SYSTEM PROVIDED HISTORY: fall TECHNOLOGIST PROVIDED HISTORY: Reason for exam:->fall Has a \"code stroke\" or \"stroke alert\" been called? ->No Reason for Exam: possible syncope on saturday-denies head pain Acuity: Acute Type of Exam: Initial FINDINGS: . BRAIN AND VENTRICLES: The ventricles are midline and symmetric. There is no evidence of intracranial hemorrhage, focal mass lesion, or other acute intracranial abnormality. SKULL: There is no evidence of calvarial fracture. C-SPINE: Vertebral body height and alignment is maintained. There is no evidence of fracture or other acute osseous abnormality. There is no significant degenerative change. No traumatic abnormality.      Ct Chest Wo Contrast    Result Date: 1/4/2021 EXAMINATION: CT OF THE CHEST WITHOUT CONTRAST 1/4/2021 10:42 am TECHNIQUE: CT of the chest was performed without the administration of intravenous contrast. Multiplanar reformatted images are provided for review. Dose modulation, iterative reconstruction, and/or weight based adjustment of the mA/kV was utilized to reduce the radiation dose to as low as reasonably achievable. COMPARISON: April 22, 2020 HISTORY: ORDERING SYSTEM PROVIDED HISTORY: fall, L rib pain TECHNOLOGIST PROVIDED HISTORY: Reason for exam:->fall, L rib pain Reason for Exam: fell on saturday-c/o left rib pain Acuity: Acute Type of Exam: Initial FINDINGS: Mediastinum: No evidence of acute process on noncontrast evaluation. Small hiatal hernia. No pericardial effusion. Lungs/pleura: There are several small subcentimeter grouped new pulmonary nodules in the right upper lobe. No pneumothorax. No pleural effusion. Minimal bilateral atelectasis. No consolidation. Upper Abdomen: No evidence of acute process. Right renal cyst again noted. Soft Tissues/Bones: Nondisplaced posterior left 10th and 11th rib fractures which are remote. No acute rib fracture demonstrated. Compression fracture deformity of 2 vertebral segments midthoracic spine also unchanged     Remote nondisplaced left posterolateral 10th and rib fractures. No acute rib fracture demonstrated. Stable remote compression fractures in the midthoracic spine also noted. Several new subcentimeter nodules in the right upper lobe most of which are grouped together and are most likely infectious or inflammatory in etiology. Follow-up chest CT in 3 months may be considered.      Ct Cervical Spine Wo Contrast    Result Date: 1/4/2021 EXAMINATION: CT OF THE HEAD WITHOUT CONTRAST; CT OF THE CERVICAL SPINE WITHOUT CONTRAST 1/4/2021 10:42 am TECHNIQUE: CT of the head was performed without the administration of intravenous contrast. Dose modulation, iterative reconstruction, and/or weight based adjustment of the mA/kV was utilized to reduce the radiation dose to as low as reasonably achievable.; CT of the cervical spine was performed without the administration of intravenous contrast. Multiplanar reformatted images are provided for review. Dose modulation, iterative reconstruction, and/or weight based adjustment of the mA/kV was utilized to reduce the radiation dose to as low as reasonably achievable. COMPARISON: None. HISTORY: ORDERING SYSTEM PROVIDED HISTORY: fall TECHNOLOGIST PROVIDED HISTORY: Reason for exam:->fall Has a \"code stroke\" or \"stroke alert\" been called? ->No Reason for Exam: possible syncope on saturday-denies head pain Acuity: Acute Type of Exam: Initial FINDINGS: . BRAIN AND VENTRICLES: The ventricles are midline and symmetric. There is no evidence of intracranial hemorrhage, focal mass lesion, or other acute intracranial abnormality. SKULL: There is no evidence of calvarial fracture. C-SPINE: Vertebral body height and alignment is maintained. There is no evidence of fracture or other acute osseous abnormality. There is no significant degenerative change. No traumatic abnormality. Xr Chest Portable    Result Date: 1/4/2021  EXAMINATION: ONE XRAY VIEW OF THE CHEST 1/4/2021 10:21 am COMPARISON: May 29, 2019 HISTORY: ORDERING SYSTEM PROVIDED HISTORY: rib pain syncope TECHNOLOGIST PROVIDED HISTORY: Reason for exam:->rib pain syncope Reason for Exam: rib pain,syncope Acuity: Acute Type of Exam: Initial FINDINGS: No evidence of traumatic process of the lungs. No evidence of pneumothorax or pleural effusion. No evidence of acute traumatic process involving the cardiac/mediastinal structures. No acute fracture demonstrated. No x-ray evidence of acute trauma to the chest or acute cardiopulmonary disease. ED COURSE   pain control was not required while here in the emergency department. Triage vitals stable. Patient does have some episodes of bradycardia down to 50s. Markedly orthostatic. Initiated on a 1 L IV fluid bolus. CBC without leukocytosis. H&H 9.9 and 29.9 respectively. According to chart review does not appear significantly different from hemoglobin of 10.9, 1-year ago. Urinalysis with out evidence to suggest infectious process. CMP with BUN and creatinine of 15 and 1.1 with GFR 46. According to chart review this does not appear baseline. Troponin negative. Stable portable chest x-ray. X-ray imaging of right hand without evidence to suggest acute fractures dislocations as was right tib/fib plain films. Head CT demonstrating no acute intercranial abnormality and CT cervical spine without fractures dislocations either. Patient appears unsteady on feet and is still having fairly significant chest pain despite CT demonstrated no acute rib fractures. However at this time recommending admission. Discussed case with hospital medicine and orders placed. A discussion was had with MsEren Viola Correa regarding fall and rib pain, ED findings and recommendations for admission. Risk management discussed and shared decision making had with patient and/or surrogate. All questions were answered. Patient is in agreement.     MDM  Results for orders placed or performed during the hospital encounter of 01/04/21   CBC Auto Differential   Result Value Ref Range    WBC 5.4 4.0 - 11.0 K/uL    RBC 2.30 (L) 4.00 - 5.20 M/uL    Hemoglobin 9.9 (L) 12.0 - 16.0 g/dL    Hematocrit 29.9 (L) 36.0 - 48.0 %    .8 (H) 80.0 - 100.0 fL    MCH 43.1 (H) 26.0 - 34.0 pg    MCHC 33.2 31.0 - 36.0 g/dL    RDW 15.7 (H) 12.4 - 15.4 %    Platelets 501 514 - 405 K/uL    MPV 6.8 5.0 - 10.5 fL    SLIDE REVIEW see below     Path Consult Yes Neutrophils % 60.6 %    Lymphocytes % 31.5 %    Monocytes % 6.3 %    Eosinophils % 1.2 %    Basophils % 0.4 %    Neutrophils Absolute 3.3 1.7 - 7.7 K/uL    Lymphocytes Absolute 1.7 1.0 - 5.1 K/uL    Monocytes Absolute 0.3 0.0 - 1.3 K/uL    Eosinophils Absolute 0.1 0.0 - 0.6 K/uL    Basophils Absolute 0.0 0.0 - 0.2 K/uL   Comprehensive Metabolic Panel   Result Value Ref Range    Sodium 138 136 - 145 mmol/L    Potassium 4.1 3.5 - 5.1 mmol/L    Chloride 105 99 - 110 mmol/L    CO2 25 21 - 32 mmol/L    Anion Gap 8 3 - 16    Glucose 142 (H) 70 - 99 mg/dL    BUN 15 7 - 20 mg/dL    CREATININE 1.1 0.6 - 1.2 mg/dL    GFR Non- 46 (A) >60    GFR  56 (A) >60    Calcium 8.7 8.3 - 10.6 mg/dL    Total Protein 6.6 6.4 - 8.2 g/dL    Alb 4.0 3.4 - 5.0 g/dL    Albumin/Globulin Ratio 1.5 1.1 - 2.2    Total Bilirubin 0.6 0.0 - 1.0 mg/dL    Alkaline Phosphatase 49 40 - 129 U/L    ALT <5 (L) 10 - 40 U/L    AST 19 15 - 37 U/L    Globulin 2.6 g/dL   Troponin   Result Value Ref Range    Troponin <0.01 <0.01 ng/mL   Urinalysis   Result Value Ref Range    Color, UA Yellow Straw/Yellow    Clarity, UA Clear Clear    Glucose, Ur Negative Negative mg/dL    Bilirubin Urine Negative Negative    Ketones, Urine Negative Negative mg/dL    Specific Gravity, UA 1.020 1.005 - 1.030    Blood, Urine Negative Negative    pH, UA 5.5 5.0 - 8.0    Protein, UA Negative Negative mg/dL    Urobilinogen, Urine 0.2 <2.0 E.U./dL    Nitrite, Urine Negative Negative    Leukocyte Esterase, Urine Negative Negative    Microscopic Examination Not Indicated     Urine Type NotGiven    EKG 12 Lead   Result Value Ref Range    Ventricular Rate 50 BPM    Atrial Rate 50 BPM    P-R Interval 212 ms    QRS Duration 102 ms    Q-T Interval 458 ms    QTc Calculation (Bazett) 417 ms    P Axis 38 degrees    R Axis -1 degrees    T Axis 53 degrees    Diagnosis Sinus bradycardia with 1st degree A-V blockNonspecific T wave abnormalityAbnormal ECGWhen compared with ECG of 29-MAY-2019 12:59,Questionable change in QRS durationConfirmed by Carmelita Purdy ([id number]) on 1/4/2021 12:37:36 PM     I spoke with Travis Zimmerman and Hodan. We thoroughly discussed the history, physical exam, laboratory and imaging studies, as well as, emergency department course. Based upon that discussion, we've decided to admit Yue Graham to the hospital for further observation, evaluation, and treatment. Final Impression  1. Syncope and collapse    2. Rib pain on left side    3. Orthostasis      Blood pressure (!) 141/60, pulse 53, temperature 97.7 °F (36.5 °C), temperature source Oral, resp. rate 16, height 5' 5\" (1.651 m), weight 114 lb (51.7 kg), SpO2 99 %. DISPOSITION  Patient was admitted to the hospital in stable condition.          Hanna Yo, [address]  01/04/21 9522

## 2021-01-04 NOTE — ED NOTES
[facility] 76. fall/orthostatic  [address] called back     Fannie Parks  01/04/21 1424

## 2021-01-04 NOTE — ED PROVIDER NOTES
EKG interpretation:  Sinus bradycardia, rate 50, no acute ST changes. Inferior T wave flattening. Leftward axis deviation, QTC within normal limits.   Does not appear significantly changed from prior dated May 29, 2019     Miguel A Jefferson MD  01/04/21 1023

## 2021-01-04 NOTE — ED NOTES
Bed: 07  Expected date: 1/4/21  Expected time: 9:51 AM  Means of arrival: [facility]  Comments:  [facility] [address]     Kallie Boas, RN  01/04/21 1001

## 2021-01-05 LAB
GLUCOSE BLD-MCNC: 104 MG/DL (ref 70–99)
GLUCOSE BLD-MCNC: 107 MG/DL (ref 70–99)
GLUCOSE BLD-MCNC: 119 MG/DL (ref 70–99)
GLUCOSE BLD-MCNC: 131 MG/DL (ref 70–99)
HEMATOLOGY PATH CONSULT: NORMAL
LV EF: 58 %
LVEF MODALITY: NORMAL
PERFORMED ON: ABNORMAL

## 2021-01-05 PROCEDURE — G0378 HOSPITAL OBSERVATION PER HR: HCPCS

## 2021-01-05 PROCEDURE — 2580000003 HC RX 258: Performed by: INTERNAL MEDICINE

## 2021-01-05 PROCEDURE — 97166 OT EVAL MOD COMPLEX 45 MIN: CPT

## 2021-01-05 PROCEDURE — 96361 HYDRATE IV INFUSION ADD-ON: CPT

## 2021-01-05 PROCEDURE — 6370000000 HC RX 637 (ALT 250 FOR IP): Performed by: INTERNAL MEDICINE

## 2021-01-05 PROCEDURE — 97530 THERAPEUTIC ACTIVITIES: CPT

## 2021-01-05 PROCEDURE — 97162 PT EVAL MOD COMPLEX 30 MIN: CPT

## 2021-01-05 PROCEDURE — 6360000002 HC RX W HCPCS: Performed by: INTERNAL MEDICINE

## 2021-01-05 PROCEDURE — C8929 TTE W OR WO FOL WCON,DOPPLER: HCPCS

## 2021-01-05 RX ORDER — MECLIZINE HCL 12.5 MG/1
12.5 TABLET ORAL 3 TIMES DAILY PRN
Status: DISCONTINUED | OUTPATIENT
Start: 2021-01-05 | End: 2021-01-08 | Stop reason: HOSPADM

## 2021-01-05 RX ORDER — SODIUM CHLORIDE, SODIUM LACTATE, POTASSIUM CHLORIDE, AND CALCIUM CHLORIDE .6; .31; .03; .02 G/100ML; G/100ML; G/100ML; G/100ML
500 INJECTION, SOLUTION INTRAVENOUS ONCE
Status: COMPLETED | OUTPATIENT
Start: 2021-01-05 | End: 2021-01-05

## 2021-01-05 RX ADMIN — PROPAFENONE HYDROCHLORIDE 225 MG: 225 CAPSULE, EXTENDED RELEASE ORAL at 05:45

## 2021-01-05 RX ADMIN — SODIUM CHLORIDE, PRESERVATIVE FREE 10 ML: 5 INJECTION INTRAVENOUS at 09:08

## 2021-01-05 RX ADMIN — GABAPENTIN 100 MG: 100 CAPSULE ORAL at 20:57

## 2021-01-05 RX ADMIN — DORZOLAMIDE HYDROCHLORIDE 1 DROP: 20 SOLUTION/ DROPS OPHTHALMIC at 14:32

## 2021-01-05 RX ADMIN — TIMOLOL MALEATE 1 DROP: 5 SOLUTION OPHTHALMIC at 09:07

## 2021-01-05 RX ADMIN — DORZOLAMIDE HYDROCHLORIDE 1 DROP: 20 SOLUTION/ DROPS OPHTHALMIC at 09:07

## 2021-01-05 RX ADMIN — SODIUM CHLORIDE, POTASSIUM CHLORIDE, SODIUM LACTATE AND CALCIUM CHLORIDE 500 ML: 600; 310; 30; 20 INJECTION, SOLUTION INTRAVENOUS at 14:36

## 2021-01-05 RX ADMIN — GABAPENTIN 100 MG: 100 CAPSULE ORAL at 09:08

## 2021-01-05 RX ADMIN — DORZOLAMIDE HYDROCHLORIDE 1 DROP: 20 SOLUTION/ DROPS OPHTHALMIC at 21:04

## 2021-01-05 RX ADMIN — ATORVASTATIN CALCIUM 10 MG: 10 TABLET, FILM COATED ORAL at 20:57

## 2021-01-05 RX ADMIN — SODIUM CHLORIDE, PRESERVATIVE FREE 10 ML: 5 INJECTION INTRAVENOUS at 20:57

## 2021-01-05 RX ADMIN — PROPAFENONE HYDROCHLORIDE 225 MG: 225 CAPSULE, EXTENDED RELEASE ORAL at 14:32

## 2021-01-05 RX ADMIN — ASPIRIN 81 MG: 81 TABLET, COATED ORAL at 09:08

## 2021-01-05 RX ADMIN — METOPROLOL SUCCINATE 25 MG: 50 TABLET, EXTENDED RELEASE ORAL at 09:08

## 2021-01-05 ASSESSMENT — PAIN SCALES - WONG BAKER: WONGBAKER_NUMERICALRESPONSE: 4

## 2021-01-05 ASSESSMENT — PAIN SCALES - GENERAL
PAINLEVEL_OUTOF10: 1
PAINLEVEL_OUTOF10: 0

## 2021-01-05 ASSESSMENT — PAIN DESCRIPTION - FREQUENCY: FREQUENCY: CONTINUOUS

## 2021-01-05 ASSESSMENT — PAIN DESCRIPTION - LOCATION
LOCATION: RIB CAGE
LOCATION: RIB CAGE

## 2021-01-05 ASSESSMENT — PAIN DESCRIPTION - DESCRIPTORS: DESCRIPTORS: ACHING

## 2021-01-05 ASSESSMENT — PAIN DESCRIPTION - PAIN TYPE: TYPE: ACUTE PAIN

## 2021-01-05 ASSESSMENT — PAIN - FUNCTIONAL ASSESSMENT: PAIN_FUNCTIONAL_ASSESSMENT: ACTIVITIES ARE NOT PREVENTED

## 2021-01-05 NOTE — PROGRESS NOTES
Hospitalist Progress Note      PCP: Pa Mahoney MD    Date of Admission: 1/4/2021    Chief Complaint:  I fell    Hospital Course:      Subjective:  No overnight issues, wants to go home, has not gotten up today       Medications:  Reviewed    Infusion Medications   Scheduled Medications    aspirin  81 mg Oral Daily    metoprolol succinate  25 mg Oral Daily    propafenone  225 mg Oral Q8H    timolol  1 drop Both Eyes Daily    sodium chloride flush  10 mL Intravenous 2 times per day    enoxaparin  30 mg Subcutaneous Daily    dorzolamide  1 drop Ophthalmic TID    lidocaine  1 patch Transdermal Daily    gabapentin  100 mg Oral BID    atorvastatin  10 mg Oral Nightly     PRN Meds: sodium chloride flush, promethazine **OR** ondansetron, polyethylene glycol, acetaminophen **OR** acetaminophen, perflutren lipid microspheres      Intake/Output Summary (Last 24 hours) at 1/5/2021 1418  Last data filed at 1/5/2021 1417  Gross per 24 hour   Intake 1350 ml   Output 1050 ml   Net 300 ml       Physical Exam Performed:    /60   Pulse 67   Temp 98 °F (36.7 °C) (Oral)   Resp 16   Ht 5' 5\" (1.651 m)   Wt 114 lb (51.7 kg)   SpO2 95%   BMI 18.97 kg/m²     General appearance:  No apparent distress, appears stated age and cooperative. Frail appearing  HEENT:  Normal cephalic, atraumatic without obvious deformity. Pupils equal, round, and reactive to light. Extra ocular muscles intact. Conjunctivae/corneas clear. [place]  Neck: Supple, with full range of motion. No jugular venous distention. Trachea midline. Respiratory:  Normal respiratory effort. Clear to auscultation, bilaterally without Rales/Wheezes/Rhonchi. Cardiovascular:  Regular rate and rhythm with normal S1/S2 without murmurs, rubs or gallops. Abdomen: Soft, non-tender, non-distended with normal bowel sounds. Musculoskeletal:  No clubbing, cyanosis or edema bilaterally. Full range of motion without deformity. Skin: Skin color, texture, turgor normal.  No rashes or lesions. Neurologic:  Neurovascularly intact without any focal sensory/motor deficits. Cranial nerves: II-XII intact, grossly non-focal.  Psychiatric:  Alert and oriented, thought content appropriate, normal insight  Capillary Refill: Brisk,< 3 seconds   Peripheral Pulses: +2 palpable, equal bilaterally       Labs:   Recent Labs     01/04/21  1020   WBC 5.4   HGB 9.9*   HCT 29.9*        Recent Labs     01/04/21  1020      K 4.1      CO2 25   BUN 15   CREATININE 1.1   CALCIUM 8.7     Recent Labs     01/04/21  1020   AST 19   ALT <5*   BILITOT 0.6   ALKPHOS 49     No results for input(s): INR in the last 72 hours. Recent Labs     01/04/21  1020   TROPONINI <0.01       Urinalysis:      Lab Results   Component Value Date    NITRU Negative 01/04/2021    BLOODU Negative 01/04/2021    SPECGRAV 1.020 01/04/2021    GLUCOSEU Negative 01/04/2021       Radiology:  CT HEAD WO CONTRAST   Final Result   No traumatic abnormality. CT CERVICAL SPINE WO CONTRAST   Final Result   No traumatic abnormality. CT CHEST WO CONTRAST   Final Result   Remote nondisplaced left posterolateral 10th and rib fractures. No acute rib   fracture demonstrated. Stable remote compression fractures in the   midthoracic spine also noted. Several new subcentimeter nodules in the right upper lobe most of which are   grouped together and are most likely infectious or inflammatory in etiology. Follow-up chest CT in 3 months may be considered. XR HAND RIGHT (MIN 3 VIEWS)   Final Result   Negative for fracture         XR TIBIA FIBULA RIGHT (2 VIEWS)   Final Result   Negative         XR CHEST PORTABLE   Final Result   No x-ray evidence of acute trauma to the chest or acute cardiopulmonary   disease.                  Assessment/Plan:    Active Hospital Problems    Diagnosis    Syncope and collapse [R55] Syncope- unclear etiology, ekg noted sinus cesilia, also with + orthostatic hypotension, Ct head/neck were unremarkable  -check  echo  -tele  -ivfs given  -Check am orthostatics     Chest pain- likely from prior Rib fx(noted on CT however remote) or msk strain  -supportive care     Afib- per emr, sinus on ekg on admission  -on Asa,bb, rythmol        Dm2- per emr  -ac/hs bs     Glaucoma- continued timolol, trusport     htn- held home norvasc given above        DVT Prophylaxis: lovenox  Diet: DIET GENERAL;  Code Status: DNR-CC (ER nurse spoke to and admitting MD confirmed with daughter(and POA) Olga Norris)    PT/OT Eval Status: rec snf    Dispo - likely tomorrow, trial LR bolus, meclizine for dizziness    Nieves Bui MD

## 2021-01-05 NOTE — PROGRESS NOTES
RN called patient's mother, Iraj cee, verified in chart. Iraj cee verified understanding. RN will continue to provide updates as appropriate. Will continue to monitor.

## 2021-01-05 NOTE — PROGRESS NOTES
Orthostatics:     BP Lyin/75  Pulse Lyin      BP sittin/64  Pulse Sittin    BP Standing: Patient unable to tolerate standing due to dizziness. Patient was orthostatic with PT this AM, see note. Will continue to monitor.

## 2021-01-05 NOTE — PLAN OF CARE
Problem: Falls - Risk of:  Goal: Will remain free from falls  Description: Will remain free from falls  Outcome: Ongoing  Note: Patient will remain free of falls. Patient calls out appropriately. Patient utilizes call light, bed is in lowest locked position, nonskid footwear in place. Patient's room is kept free of clutter and patient is assisted with ambulation as needed. Will continue to monitor and educate patient on safety precautions. Problem: Pain:  Goal: Control of acute pain  Description: Control of acute pain  Outcome: Ongoing  Note: Patient will remain free of falls. Patient calls out appropriately. Patient utilizes call light, bed is in lowest locked position, nonskid footwear in place. Patient's room is kept free of clutter and patient is assisted with ambulation as needed. Will continue to monitor and educate patient on safety precautions.

## 2021-01-05 NOTE — PROGRESS NOTES
Physical Therapy    Facility/Department: [facility] A1 REMOTE TELEMETRY  Initial Assessment    NAME: Mack Rock  : 1924  MRN: [id number]    Date of Service: 2021    Discharge Recommendations:  Subacute/Skilled Nursing Facility   PT Equipment Recommendations  Equipment Needed: No    Assessment   Body structures, Functions, Activity limitations: Decreased functional mobility ; Decreased posture;Decreased ADL status; Decreased endurance;Decreased strength;Decreased balance;Decreased safe awareness; Increased pain  Assessment: Patient seen for PT evaluation, transfers, and LE strengthening. Patient cleared by RN for therapy participation this date. Patient agreeable to therapy. Patient admitted for syncope and is s/p fall. Patient completed mobility and stood at [facility] with min A x2 while donning pants with OT assisting with LB dressing. Pt fatigues following standing with pt's BP orthostatic. See activity tolerance section for details. P.T .will continue to follow throughout LOS. Recommending DC to SNF d/t level of assist, decreased safety awareness, and history of falls. Treatment Diagnosis: decreased independence with mobility  Specific instructions for Next Treatment: progress mobility as tolerated  Prognosis: Fair  Decision Making: Medium Complexity  PT Education: Goals;Transfer Training;Disease Specific Education;PT Role;Functional Mobility Training;Plan of Care;General Safety;Orientation;Home Exercise Program;Precautions  Patient Education: pt educated on vitals response with activity and safe transfers  Barriers to Learning: cognition, [place], visual deficits  REQUIRES PT FOLLOW UP: Yes  Activity Tolerance  Activity Tolerance: Treatment limited secondary to medical complications (free text)  Activity Tolerance: Pt's BP 86/54 after standing to don pants at toilet, 70/50 at EOB in sitting, 113/60 in supine. Pt states feeling \"faint\". Patient Diagnosis(es): The primary encounter diagnosis was Syncope and collapse. Diagnoses of Rib pain on left side and Orthostasis were also pertinent to this visit. has a past medical history of Atrial fibrillation ([address] Colon polyps, Depression, Diabetes mellitus ([address] Diverticula of colon, Elbow injury, Esophageal reflux, Glaucoma, Hearing loss, Hyperlipidemia, Insomnia, Osteoporosis, PAT (paroxysmal atrial tachycardia) ([address] Rhinitis, allergic, and Wears glasses. has a past surgical history that includes Cholecystectomy; Colonoscopy; Hysterectomy; Tonsillectomy; Appendectomy; Cataract removal with implant (1/17/11); and Cataract removal with implant (t). Restrictions  Restrictions/Precautions  Restrictions/Precautions: Fall Risk, General Precautions  Position Activity Restriction  Other position/activity restrictions:  Up with assist, telemetry, high fall risk        Subjective  General  Chart Reviewed: Yes  Patient assessed for rehabilitation services?: Yes  Response To Previous Treatment: Not applicable  Family / Caregiver Present: No  Referring Practitioner: Joyce Isbell  Referral Date : 01/04/20  Diagnosis: s/p fall, syncope  Follows Commands: Impaired  Subjective  Subjective: pt on toilet with PCA, agreeable to return to bed  Pain Screening  Patient Currently in Pain: Denies  Pain Assessment  Pain Assessment: Faces  Castellanos-Baker Pain Rating: Hurts little more  Pain Type: Acute pain  Pain Location: Rib cage  Pain Descriptors: Aching  Non-Pharmaceutical Pain Intervention(s): Ambulation/Increased Activity;Repositioned  Vital Signs  Patient Currently in Pain: Yes       Orientation  Orientation  Overall Orientation Status: Within Functional Limits  Social/Functional History  Social/Functional History  Lives With: Alone  Type of Home: Facility([facility])  Bathroom Shower/Tub: Walk-in shower, Shower chair with back  Bathroom Toilet: Standard Bathroom Equipment: Grab bars in shower, Grab bars around toilet  Home Equipment: 4 wheeled walker, Alert Button, [employer], Lift chair  Receives Help From: Personal care attendant(homemaker assist for linen change, laundry; PCA for showers & daily for ADL's)  ADL Assistance: Needs assistance  Bath: Maximum assistance  Dressing: Maximum assistance  Grooming: Minimal assistance  Toileting: Needs assistance  Homemaking Responsibilities: No  Ambulation Assistance: Needs assistance(with [employer])  Transfer Assistance: Needs assistance  Active : No  Additional Comments: reports 3 falls with fractures (rib fx); unsure of reliable info provided by patient, due to [facility] & decreased STM  Cognition   Cognition  Overall Cognitive Status: Exceptions  Arousal/Alertness: Delayed responses to stimuli  Following Commands:  Follows one step commands with increased time  Attention Span: Attends with cues to redirect  Memory: Decreased recall of precautions;Decreased short term memory;Decreased recall of recent events  Safety Judgement: Decreased awareness of need for safety;Decreased awareness of need for assistance  Problem Solving: Assistance required to implement solutions  Insights: Not aware of deficits  Initiation: Requires cues for all  Sequencing: Requires cues for some    Objective     Observation/Palpation  Posture: Fair  Observation: scabbing RLE    AROM RLE (degrees)  RLE AROM: WFL  AROM LLE (degrees)  LLE AROM : WFL  Strength RLE  Strength RLE: WFL  Strength LLE  Strength LLE: WFL        Bed mobility  Rolling to Right: Moderate assistance  Supine to Sit: Unable to assess(on toilet with PCA start of session)  Sit to Supine: Maximum assistance(for BLE)  Transfers  Sit to Stand: 2 Person Assistance;Minimal Assistance(min A x2 with RW and w[place] skinner)  Stand to sit: 2 Person Assistance;Minimal Assistance(min A x2 with RW and [place] skinner)  Bed to Chair: Dependent/Total(w[place]ka skinner) Comment: Pt stood ~2 mins at RW with min A x2 for balance and cues for technique while donning briefs and pants at toilet. Pt requests to sit d/t fatigue and dizziness. Ambulation  Ambulation?: No  Stairs/Curb  Stairs?: No        Exercises  Quad Sets: 1x 10 BLE  Heelslides: 1x 10 BLE  Ankle Pumps: 1x 20 BLE     Plan   Plan  Times per week: 3-5  Plan weeks: 1/12/20  Specific instructions for Next Treatment: progress mobility as tolerated  Current Treatment Recommendations: Strengthening, Transfer Training, Endurance Training, Patient/Caregiver Education & Training, Balance Training, Gait Training, Home Exercise Program, Functional Mobility Training, Safety Education & Training  Safety Devices  Type of devices: All fall risk precautions in place, Patient at risk for falls, Bed alarm in place, Left in bed, Call light within reach, Gait belt, Nurse notified  Restraints  Initially in place: No    AM-PAC Score     AM-PAC Inpatient Mobility without Stair Climbing Raw Score : 8 (01/05/21 1116)  AM-PAC Inpatient without Stair Climbing T-Scale Score : 30.65 (01/05/21 1116)  Mobility Inpatient CMS 0-100% Score: 80.91 (01/05/21 1116)  Mobility Inpatient without Stair CMS G-Code Modifier : CM (01/05/21 1116)       Goals  Short term goals  Time Frame for Short term goals: 1/12/20  Short term goal 1: Pt will complete bed mobility with min A. Short term goal 2: Pt will complete sit<>stands with RW and min A. Short term goal 3: Pt will complete bed<>chair with RW and min A. Short term goal 4: Pt will ambulate 15 ft with RW and min A. Short term goal 5: Pt will tolerate 12-15 reps of LE exercises for strengthening and balance by 1/7/20.   Patient Goals   Patient goals : \"get better\"       Therapy Time   Individual Concurrent Group Co-treatment   Time In 0537         Time Out 1015         Minutes 20         Timed Code Treatment Minutes: 10 Minutes If pt is unable to be seen after this session, please let this note serve as discharge summary. Please see case management note for discharge disposition. Thank you.     Keely Pedersen, PT

## 2021-01-05 NOTE — PROGRESS NOTES
Educated patient on purpose of 4 eyes skin assessment and asked patient if allowed to perform, patient verbalized understanding and agreed to assessment. 4 Eyes Skin Assessment     The patient is being assess for  Admission    I agree that 2 RN's have performed a thorough Head to Toe Skin Assessment on the patient. ALL assessment sites listed below have been assessed. Areas assessed by both nurses: Sivakumar Carter RN and Tanja Garcia RN  [x]   Head, Face, and Ears   [x]   Shoulders, Back, and Chest  [x]   Arms, Elbows, and Hands   [x]   Coccyx, Sacrum, and IschIum  [x]   Legs, Feet, and Heels   Pt noted to have an abrasion to right knee and shin. Pt also noted to have calluses to plantar surface of feet. Does the Patient have Skin Breakdown?   No        Keven Prevention initiated:  NA   Wound Care Orders initiated:  NA      [facility] nurse consulted for Pressure Injury (Stage 3,4, Unstageable, DTI, NWPT, and Complex wounds), New and Established Ostomies:  NA      Nurse 1 eSignature: Electronically signed by Meghann Stewart RN on 1/5/21 at 4:30 AM EST    **SHARE this note so that the co-signing nurse is able to place an eSignature**    Nurse 2 eSignature: Arnold Washington RN

## 2021-01-05 NOTE — PROGRESS NOTES
Patient assessed for rehabilitation services?: Yes  Family / Caregiver Present: No  Referring Practitioner: Dr. Jeffry Figueredo  Diagnosis: syncope & collapse, fall with Left side rib pain  Subjective  Subjective: \"are the nurse, have you called my daughter to take me home?   General Comment  Comments: RN cleared pt for OT eval; pt sitting on toilet with PCA  Patient Currently in Pain: Yes  Pain Assessment  Pain Assessment: Faces  Castellanos-Baker Pain Rating: Hurts little more  Pain Type: Acute pain  Pain Location: Rib cage  Pain Orientation: Left  Pain Descriptors: Aching  Functional Pain Assessment: Prevents or interferes some active activities and ADLs  Non-Pharmaceutical Pain Intervention(s): Ambulation/Increased Activity;Repositioned  Pre Treatment Pain Screening  Intervention List: Patient able to continue with treatment    Social/Functional History  Social/Functional History  Lives With: Alone  Type of Home: Facility([facility])  Bathroom Shower/Tub: Walk-in shower, Shower chair with back  Bathroom Toilet: Standard  Bathroom Equipment: Grab bars in shower, Grab bars around toilet  Home Equipment: 4 wheeled walker, Alert Button, [employer], Lift chair  Receives Help From: Personal care attendant(homemaker assist for linen change, laundry; PCA for showers & daily for ADL's)  ADL Assistance: Needs assistance  Bath: Maximum assistance  Dressing: Maximum assistance  Grooming: Minimal assistance  Toileting: Needs assistance  Homemaking Responsibilities: No  Ambulation Assistance: Needs assistance(with [address] [place] [address])  Transfer Assistance: Needs assistance  Active : No  Additional Comments: reports 3 falls with fractures (rib fx); unsure of reliable info provided by patient, due to [facility] & decreased [facility]       Objective   Vision: Impaired  Vision Exceptions: Legally blind(glaucoma & use magnifiers to sign paper work)  Hearing: Exceptions to [facility] Hearing Exceptions: Hard of hearing/hearing concerns;Bilateral hearing aid    Orientation  Overall Orientation Status: Within Functional Limits  Observation/Palpation  Posture: Fair  Observation: scabbing RLE  Balance  Sitting Balance: Supervision  Standing Balance: Moderate assistance(posterior lean)  Standing Balance  Time: 1-2 minutes  Activity: LE clothing management after toileting  Comment: increased c/o fatigue with standing  Toilet Transfers  Toilet - Technique: Ambulating(with PCA into bathroom; pt syncopal & required ROHIT STEDY back to bed)  Equipment Used: Grab bars  Toilet Transfer: 2 Person assistance(off low toilet)  ADL  LE Dressing: Maximum assistance  Toileting: Moderate assistance    RUE Tone: Normotonic  LUE Tone: Normotonic  Coordination  Movements Are Fluid And Coordinated: No  Coordination and Movement description: Gross motor impairments; Left UE;Right UE(decreased AROM at end range & increased pain Left side)     Bed mobility  Supine to Sit: Unable to assess(already up on toilet/commode with PCA)  Sit to Supine: Maximum assistance  Transfers  Sit to stand: 2 Person assistance(from toilet)  Stand to sit: Moderate assistance     Vision - Basic Assessment  Prior Vision: Other (add comment)(uses magnifiers for signing name only)  Visual History: Glaucoma    Cognition  Overall Cognitive Status: Exceptions  Arousal/Alertness: Delayed responses to stimuli(due to [facility])  Following Commands:  Follows one step commands with increased time  Attention Span: Attends with cues to redirect  Memory: Decreased recall of precautions;Decreased short term memory;Decreased recall of recent events  Safety Judgement: Decreased awareness of need for safety;Decreased awareness of need for assistance  Problem Solving: Assistance required to implement solutions  Insights: Not aware of deficits  Initiation: Requires cues for all  Sequencing: Requires cues for some             Plan   Plan  Times per week: 3-4x/ week Current Treatment Recommendations: Endurance Training, Balance Training, Functional Mobility Training, Safety Education & Training, Positioning, Self-Care / ADL           AM-PAC Score        AM-PAC Inpatient Daily Activity Raw Score: 14 (01/05/21 1104)  AM-PAC Inpatient ADL T-Scale Score : 33.39 (01/05/21 1104)  ADL Inpatient CMS 0-100% Score: 59.67 (01/05/21 1104)  ADL Inpatient CMS G-Code Modifier : CK (01/05/21 1104)    Goals  Short term goals  Time Frame for Short term goals: 1 week(1-12-21)  Short term goal 1: min assist with bathroom mobility with RW by 1-12-21  Short term goal 2: CGA with standing ADL's for 1-2 minutes  Short term goal 3: tolerate sitting up in chair for meals for 30 minutes  Short term goal 4: set up only for UE self care by 1-10-21  Patient Goals   Patient goals : go home       Therapy Time   Individual Concurrent Group Co-treatment   Time In [facility]         Time Out 1036         Minutes [address] [facility], [facility], [place]

## 2021-01-06 LAB
GLUCOSE BLD-MCNC: 122 MG/DL (ref 70–99)
GLUCOSE BLD-MCNC: 147 MG/DL (ref 70–99)
GLUCOSE BLD-MCNC: 221 MG/DL (ref 70–99)
GLUCOSE BLD-MCNC: 92 MG/DL (ref 70–99)
PERFORMED ON: ABNORMAL
PERFORMED ON: NORMAL

## 2021-01-06 PROCEDURE — 6370000000 HC RX 637 (ALT 250 FOR IP): Performed by: INTERNAL MEDICINE

## 2021-01-06 PROCEDURE — 1200000000 HC SEMI PRIVATE

## 2021-01-06 PROCEDURE — 96372 THER/PROPH/DIAG INJ SC/IM: CPT

## 2021-01-06 PROCEDURE — 2580000003 HC RX 258: Performed by: NURSE PRACTITIONER

## 2021-01-06 PROCEDURE — 2580000003 HC RX 258: Performed by: INTERNAL MEDICINE

## 2021-01-06 PROCEDURE — G0378 HOSPITAL OBSERVATION PER HR: HCPCS

## 2021-01-06 PROCEDURE — 6360000002 HC RX W HCPCS: Performed by: INTERNAL MEDICINE

## 2021-01-06 RX ORDER — MECLIZINE HCL 12.5 MG/1
12.5 TABLET ORAL 3 TIMES DAILY PRN
Qty: 5 TABLET | Refills: 0
Start: 2021-01-06

## 2021-01-06 RX ORDER — 0.9 % SODIUM CHLORIDE 0.9 %
500 INTRAVENOUS SOLUTION INTRAVENOUS ONCE
Status: COMPLETED | OUTPATIENT
Start: 2021-01-06 | End: 2021-01-06

## 2021-01-06 RX ORDER — SODIUM CHLORIDE 9 MG/ML
INJECTION, SOLUTION INTRAVENOUS CONTINUOUS
Status: ACTIVE | OUTPATIENT
Start: 2021-01-06 | End: 2021-01-07

## 2021-01-06 RX ADMIN — ENOXAPARIN SODIUM 30 MG: 30 INJECTION SUBCUTANEOUS at 09:53

## 2021-01-06 RX ADMIN — TIMOLOL MALEATE 1 DROP: 5 SOLUTION OPHTHALMIC at 09:47

## 2021-01-06 RX ADMIN — ATORVASTATIN CALCIUM 10 MG: 10 TABLET, FILM COATED ORAL at 21:09

## 2021-01-06 RX ADMIN — GABAPENTIN 100 MG: 100 CAPSULE ORAL at 21:09

## 2021-01-06 RX ADMIN — SODIUM CHLORIDE: 9 INJECTION, SOLUTION INTRAVENOUS at 21:09

## 2021-01-06 RX ADMIN — PROPAFENONE HYDROCHLORIDE 225 MG: 225 CAPSULE, EXTENDED RELEASE ORAL at 23:43

## 2021-01-06 RX ADMIN — PROPAFENONE HYDROCHLORIDE 225 MG: 225 CAPSULE, EXTENDED RELEASE ORAL at 06:41

## 2021-01-06 RX ADMIN — DORZOLAMIDE HYDROCHLORIDE 1 DROP: 20 SOLUTION/ DROPS OPHTHALMIC at 09:47

## 2021-01-06 RX ADMIN — SODIUM CHLORIDE, PRESERVATIVE FREE 10 ML: 5 INJECTION INTRAVENOUS at 09:53

## 2021-01-06 RX ADMIN — DORZOLAMIDE HYDROCHLORIDE 1 DROP: 20 SOLUTION/ DROPS OPHTHALMIC at 21:10

## 2021-01-06 RX ADMIN — SODIUM CHLORIDE 500 ML: 9 INJECTION, SOLUTION INTRAVENOUS at 15:14

## 2021-01-06 RX ADMIN — PROPAFENONE HYDROCHLORIDE 225 MG: 225 CAPSULE, EXTENDED RELEASE ORAL at 15:14

## 2021-01-06 RX ADMIN — SODIUM CHLORIDE, PRESERVATIVE FREE 10 ML: 5 INJECTION INTRAVENOUS at 21:10

## 2021-01-06 RX ADMIN — PROPAFENONE HYDROCHLORIDE 225 MG: 225 CAPSULE, EXTENDED RELEASE ORAL at 00:49

## 2021-01-06 RX ADMIN — GABAPENTIN 100 MG: 100 CAPSULE ORAL at 09:50

## 2021-01-06 RX ADMIN — ASPIRIN 81 MG: 81 TABLET, COATED ORAL at 09:51

## 2021-01-06 RX ADMIN — DORZOLAMIDE HYDROCHLORIDE 1 DROP: 20 SOLUTION/ DROPS OPHTHALMIC at 15:14

## 2021-01-06 NOTE — PROGRESS NOTES
Hospitalist Progress Note      PCP: Pa Lee MD    Date of Admission: 1/4/2021    Chief Complaint:  I fell     Hospital Course:      Subjective: pt again was orthostatic today to 90Y systolic , reticent to go to rehab, no overnight issues      Medications:  Reviewed    Infusion Medications   Scheduled Medications    aspirin  81 mg Oral Daily    metoprolol succinate  25 mg Oral Daily    propafenone  225 mg Oral Q8H    timolol  1 drop Both Eyes Daily    sodium chloride flush  10 mL Intravenous 2 times per day    enoxaparin  30 mg Subcutaneous Daily    dorzolamide  1 drop Ophthalmic TID    lidocaine  1 patch Transdermal Daily    gabapentin  100 mg Oral BID    atorvastatin  10 mg Oral Nightly     PRN Meds: meclizine, sodium chloride flush, promethazine **OR** ondansetron, polyethylene glycol, acetaminophen **OR** acetaminophen, perflutren lipid microspheres      Intake/Output Summary (Last 24 hours) at 1/6/2021 0902  Last data filed at 1/6/2021 0500  Gross per 24 hour   Intake 1420 ml   Output 1000 ml   Net 420 ml       Physical Exam Performed:    /70   Pulse 77   Temp 98.7 °F (37.1 °C) (Oral)   Resp 16   Ht 5' 5\" (1.651 m)   Wt 114 lb (51.7 kg)   SpO2 92%   BMI 18.97 kg/m²     General appearance:  No apparent distress, appears stated age and cooperative. Frail appearing  HEENT:  Normal cephalic, atraumatic without obvious deformity. Pupils equal, round, and reactive to light.  Extra ocular muscles intact. Conjunctivae/corneas clear. [facility]  Neck: Supple, with full range of motion. No jugular venous distention. Trachea midline. Respiratory:  Normal respiratory effort. Clear to auscultation, bilaterally without Rales/Wheezes/Rhonchi. Cardiovascular:  Regular rate and rhythm with normal S1/S2 without murmurs, rubs or gallops. Abdomen: Soft, non-tender, non-distended with normal bowel sounds. Musculoskeletal:  No clubbing, cyanosis or edema bilaterally.  Full range of motion without deformity. Skin: Skin color, texture, turgor normal.  No rashes or lesions. Neurologic:  Neurovascularly intact without any focal sensory/motor deficits. Cranial nerves: II-XII intact, grossly non-focal.  Psychiatric:  Alert and oriented, thought content appropriate, normal insight  Capillary Refill: Brisk,< 3 seconds   Peripheral Pulses: +2 palpable, equal bilaterally          Labs:   Recent Labs     01/04/21  1020   WBC 5.4   HGB 9.9*   HCT 29.9*        Recent Labs     01/04/21  1020      K 4.1      CO2 25   BUN 15   CREATININE 1.1   CALCIUM 8.7     Recent Labs     01/04/21  1020   AST 19   ALT <5*   BILITOT 0.6   ALKPHOS 49     No results for input(s): INR in the last 72 hours. Recent Labs     01/04/21  1020   TROPONINI <0.01       Urinalysis:      Lab Results   Component Value Date    NITRU Negative 01/04/2021    BLOODU Negative 01/04/2021    SPECGRAV 1.020 01/04/2021    GLUCOSEU Negative 01/04/2021       Radiology:  CT HEAD WO CONTRAST   Final Result   No traumatic abnormality. CT CERVICAL SPINE WO CONTRAST   Final Result   No traumatic abnormality. CT CHEST WO CONTRAST   Final Result   Remote nondisplaced left posterolateral 10th and rib fractures. No acute rib   fracture demonstrated. Stable remote compression fractures in the   midthoracic spine also noted. Several new subcentimeter nodules in the right upper lobe most of which are   grouped together and are most likely infectious or inflammatory in etiology. Follow-up chest CT in 3 months may be considered. XR HAND RIGHT (MIN 3 VIEWS)   Final Result   Negative for fracture         XR TIBIA FIBULA RIGHT (2 VIEWS)   Final Result   Negative         XR CHEST PORTABLE   Final Result   No x-ray evidence of acute trauma to the chest or acute cardiopulmonary   disease.                  Assessment/Plan: Active Hospital Problems    Diagnosis    Syncope and collapse [R55]       Syncope- unclear etiology, ekg noted sinus cesilia, also with + orthostatic hypotension, Ct head/neck were unremarkable  -checked echo and unremarkable  -tele  -ivfs given  -Check am orthostatics was +(down to 07D systolic on 1/6)   -prn meclizine ordered  -cardiac monitor on dc    Chest pain- likely from prior Rib fx(noted on CT however remote) or msk strain  -supportive care     Afib- per emr, sinus on ekg on admission  -on Asa,bb, rythmol     Dm2- per emr  -ac/hs bs     Glaucoma- continued timolol, trusport     htn- held home norvasc given above        DVT Prophylaxis: lovenox  Diet: DIET GENERAL;  Code Status: DNR-CC (ER nurse spoke to and admitting MD confirmed with daughter(and POA) Mohini Miller)     PT/OT Eval Status: rec snf     Dispo - trial ivf bolus again, meclizine for dizziness, consider cards eval for autonomic dysfcn, repeat orthostatics this evening       Mayra Michel MD

## 2021-01-06 NOTE — DISCHARGE INSTR - COC
Continuity of Care Form    Patient Name: Felipe Franklin   :  1924  MRN:  [id number]    Admit date:  2021  Discharge date: 21    Code Status Order: DNR-CC   Advance Directives:      Admitting Physician:  Beto Gunn MD  PCP: Rock Reyna MD    Discharging Nurse: Diaz AMBROCIO Unit/Room#: 0104/0104-01  Discharging Unit Phone Number:    Emergency Contact:   Extended Emergency Contact Information  Primary Emergency Contact: Cecy Dailey Phone: [phone]  Mobile Phone: [phone]  Relation: Child  Secondary Emergency Contact: [address] Phone: [phone]  Mobile Phone: [phone]  Relation: Grandchild    Past Surgical History:  Past Surgical History:   Procedure Laterality Date    APPENDECTOMY      CATARACT REMOVAL WITH IMPLANT  11    right eye    CATARACT REMOVAL WITH IMPLANT  t    cataract removal with lens implant left eye    CHOLECYSTECTOMY      COLONOSCOPY      HYSTERECTOMY      TONSILLECTOMY      age 15       Immunization History: There is no immunization history on file for this patient. Active Problems:  Patient Active Problem List   Diagnosis Code    PAF (paroxysmal atrial fibrillation) ([facility] I48.0    Falls [address]19. 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    Syncope and collapse R55       Isolation/Infection:   Isolation            No Isolation          Patient Infection Status       Infection Onset Added Last Indicated Last Indicated By Review Planned Expiration Resolved Resolved By    None active    Resolved    COVID-19 Rule Out 21 COVID-19 (Ordered)   21 Rule-Out Test Resulted            Nurse Assessment: Last Vital Signs: BP (!) 94/42   Pulse 60   Temp 98.3 °F (36.8 °C) (Oral)   Resp 16   Ht 5' 5\" (1.651 m)   Wt 114 lb (51.7 kg)   SpO2 94%   BMI 18.97 kg/m²     Last documented pain score (0-10 scale): Pain Level: 0  Last Weight:   Wt Readings from Last 1 Encounters:   01/04/21 114 lb (51.7 kg)     Mental Status:  oriented, alert and coherent    IV Access:  - None    Nursing Mobility/ADLs:  Walking   Assisted  Transfer  Assisted  Bathing  Assisted  Dressing  Assisted  Toileting  Assisted  Feeding  Assisted  Med Admin  Assisted  Med Delivery   whole    Wound Care Documentation and Therapy:        Elimination:  Continence:   · Bowel: Yes  · Bladder: Yes  Urinary Catheter: None   Colostomy/Ileostomy/Ileal Conduit:  NO       Date of Last BM: 01/07/21    Intake/Output Summary (Last 24 hours) at 1/6/2021 1350  Last data filed at 1/6/2021 1303  Gross per 24 hour   Intake 1532 ml   Output 550 ml   Net 982 ml     I/O last 3 completed shifts: In: 2166 [P.O.:900; I.V.:20; IV Piggyback:500]  Out: 1000 [Urine:1000]    Safety Concerns:     History of falls    Impairments/Disabilities:      Vision    Nutrition Therapy:  Current Nutrition Therapy:   - Oral Diet:  General    Routes of Feeding: Oral  Liquids: {Slp liquid thickness:29555}  Daily Fluid Restriction: no  Last Modified Barium Swallow with Video (Video Swallowing Test): not done    Treatments at the Time of Hospital Discharge:   Respiratory Treatments: ***  Oxygen Therapy:  is not on home oxygen therapy.   Ventilator:    - No ventilator support    Rehab Therapies:   Weight Bearing Status/Restrictions: No weight bearing restirctions  Other Medical Equipment (for information only, NOT a DME order):  {EQUIPMENT:[id number]}  Other Treatments: ***    Patient's personal belongings (please select all that are sent with patient):  Sent with patient    RN SIGNATURE:  Electronically signed by Rosmery Ocampo RN on 1/8/21 at 2:00 PM EST    CASE MANAGEMENT/SOCIAL WORK SECTION Inpatient Status Date: 1/6/21  Readmission Risk Assessment Score:  Readmission Risk              Risk of Unplanned Readmission:        0           Discharging to Facility/ Agency   · Name: [facility]  · Address:  · [phone]  · Fax: [phone]    Dialysis Facility (if applicable)   · Name:  · Address:  · Dialysis Schedule:  · Phone:  · Fax:    / signature: Electronically signed by Rae Sanchez RN on 1/8/21 at 12:40 PM EST    PHYSICIAN SECTION    Prognosis: Fair    Condition at Discharge: Stable    Rehab Potential (if transferring to Rehab): Fair    Recommended Labs or Other Treatments After Discharge: Please adjust BP meds based on blood pressure, discuss with primary cardiology about when/if to restart Beta blocker    Physician Certification: I certify the above information and transfer of Marbin Delacruz  is necessary for the continuing treatment of the diagnosis listed and that she requires [place] for less 30 days.      Update Admission H&P: No change in H&P    PHYSICIAN SIGNATURE:  Electronically signed by Patrice Hill MD on 1/8/2021 at 11:58 AM

## 2021-01-06 NOTE — PROGRESS NOTES
Pt assessment completed and charted. VSS. Pt a/o. Pt c/o L. Sided rib pain. Orthostatic BP taken and charted. Pt c/o dizziness when standing. Pt is blind and [place]. Lidocaine patch placed on ribs. Bed in lowest position and wheels locked. Call light within reach. Bedside table within reach. Non-skid footwear in place. Pt denies any other needs at this time. Pt calls out appropriately. Will continue to monitor.

## 2021-01-06 NOTE — PROGRESS NOTES
ADVANCED CARE PLANNING    Name:Renetta Sellers       :  1924              MRN:  [id number]  Admission Date: 2021 10:02 AM  Date of Discussion:  21      Purpose of Encounter: Advanced care planning in light of syncope, advanced age. Parties in attendance: :Jaime Davis MD, Family members:daughter on phone. Decisional Capacity:yes but very [place]. Objective/Medical Story:  81 yo WF with dm2, afib, glaucoma p/w syncope of unclear etiology. Active Diagnoses: Active Hospital Problems    Diagnosis Date Noted    Syncope and collapse [R55] 2021       These active diagnoses are of sufficient risk that focused discussion on advance care planning is indicated in order to allow the patient to thoughtfully consider personal goals of care; and if situations arise that prevent the ability to personally give input, to ensure appropriate representation of their personal desires for different levels and agressiveness of care. Goals of Care Determinations: Patient wishes to focus on aggressive but not heroic measures. Code Status: At this time patient wishes to be DNRcc. Time Spent on Advanced Planning Documents: 16 mins. The following items were considered in medical decision making:  Independent review of images , Review / order clinical lab tests. Review / order radiology tests, Decision to obtain old records. Advanced Care Planning Documents:   Completed advances directives, advanced directives in chart. Electronically signed by Boy Trinh MD on 2021 at 2:53 PM    Thank you Carmine Colby MD for the opportunity to be involved in this patient's care. If you have any questions or concerns please feel free to contact me at [phone].

## 2021-01-07 LAB
EKG ATRIAL RATE: 56 BPM
EKG DIAGNOSIS: NORMAL
EKG P AXIS: 65 DEGREES
EKG P-R INTERVAL: 220 MS
EKG Q-T INTERVAL: 470 MS
EKG QRS DURATION: 106 MS
EKG QTC CALCULATION (BAZETT): 453 MS
EKG R AXIS: -17 DEGREES
EKG T AXIS: 44 DEGREES
EKG VENTRICULAR RATE: 56 BPM
GLUCOSE BLD-MCNC: 109 MG/DL (ref 70–99)
GLUCOSE BLD-MCNC: 113 MG/DL (ref 70–99)
GLUCOSE BLD-MCNC: 185 MG/DL (ref 70–99)
GLUCOSE BLD-MCNC: 199 MG/DL (ref 70–99)
PERFORMED ON: ABNORMAL
SARS-COV-2, NAAT: NOT DETECTED

## 2021-01-07 PROCEDURE — 6370000000 HC RX 637 (ALT 250 FOR IP): Performed by: INTERNAL MEDICINE

## 2021-01-07 PROCEDURE — 93005 ELECTROCARDIOGRAM TRACING: CPT | Performed by: NURSE PRACTITIONER

## 2021-01-07 PROCEDURE — U0002 COVID-19 LAB TEST NON-CDC: HCPCS

## 2021-01-07 PROCEDURE — 99223 1ST HOSP IP/OBS HIGH 75: CPT | Performed by: INTERNAL MEDICINE

## 2021-01-07 PROCEDURE — 97116 GAIT TRAINING THERAPY: CPT

## 2021-01-07 PROCEDURE — 97530 THERAPEUTIC ACTIVITIES: CPT

## 2021-01-07 PROCEDURE — 2580000003 HC RX 258: Performed by: INTERNAL MEDICINE

## 2021-01-07 PROCEDURE — 6360000002 HC RX W HCPCS: Performed by: INTERNAL MEDICINE

## 2021-01-07 PROCEDURE — 1200000000 HC SEMI PRIVATE

## 2021-01-07 PROCEDURE — 97535 SELF CARE MNGMENT TRAINING: CPT

## 2021-01-07 PROCEDURE — 93010 ELECTROCARDIOGRAM REPORT: CPT | Performed by: INTERNAL MEDICINE

## 2021-01-07 RX ORDER — PROPAFENONE HYDROCHLORIDE 225 MG/1
225 CAPSULE, EXTENDED RELEASE ORAL EVERY 12 HOURS SCHEDULED
Status: DISCONTINUED | OUTPATIENT
Start: 2021-01-07 | End: 2021-01-08 | Stop reason: HOSPADM

## 2021-01-07 RX ADMIN — PROPAFENONE HYDROCHLORIDE 225 MG: 225 CAPSULE, EXTENDED RELEASE ORAL at 06:18

## 2021-01-07 RX ADMIN — ASPIRIN 81 MG: 81 TABLET, COATED ORAL at 08:41

## 2021-01-07 RX ADMIN — GABAPENTIN 100 MG: 100 CAPSULE ORAL at 08:41

## 2021-01-07 RX ADMIN — TIMOLOL MALEATE 1 DROP: 5 SOLUTION OPHTHALMIC at 08:41

## 2021-01-07 RX ADMIN — METOPROLOL SUCCINATE 25 MG: 50 TABLET, EXTENDED RELEASE ORAL at 08:41

## 2021-01-07 RX ADMIN — DORZOLAMIDE HYDROCHLORIDE 1 DROP: 20 SOLUTION/ DROPS OPHTHALMIC at 13:48

## 2021-01-07 RX ADMIN — SODIUM CHLORIDE, PRESERVATIVE FREE 10 ML: 5 INJECTION INTRAVENOUS at 08:41

## 2021-01-07 RX ADMIN — SODIUM CHLORIDE, PRESERVATIVE FREE 10 ML: 5 INJECTION INTRAVENOUS at 21:19

## 2021-01-07 RX ADMIN — DORZOLAMIDE HYDROCHLORIDE 1 DROP: 20 SOLUTION/ DROPS OPHTHALMIC at 08:41

## 2021-01-07 RX ADMIN — ENOXAPARIN SODIUM 30 MG: 30 INJECTION SUBCUTANEOUS at 08:41

## 2021-01-07 RX ADMIN — DORZOLAMIDE HYDROCHLORIDE 1 DROP: 20 SOLUTION/ DROPS OPHTHALMIC at 21:17

## 2021-01-07 RX ADMIN — ATORVASTATIN CALCIUM 10 MG: 10 TABLET, FILM COATED ORAL at 21:14

## 2021-01-07 RX ADMIN — PROPAFENONE HYDROCHLORIDE 225 MG: 225 CAPSULE, EXTENDED RELEASE ORAL at 21:15

## 2021-01-07 RX ADMIN — GABAPENTIN 100 MG: 100 CAPSULE ORAL at 21:14

## 2021-01-07 NOTE — PROGRESS NOTES
Patient's daughter Neal Johnson called RN and asked for update on Jeannie Currie. Neal Alex verified in chart. Neal Johnsno provided update and verbalized understanding. Neal Johnson asked to be called with Jeannie Currie is transported to the [facility] so she could bring clothing/toiletries for her mom. RN verbalized understanding.

## 2021-01-07 NOTE — PROGRESS NOTES
Comprehensive Nutrition Assessment    Type and Reason for Visit:  Initial(BMI)    Nutrition Recommendations/Plan:   1. Continue general diet   2. Add Magic Cups BID   3. Obtain new weight   4. Monitor nutrition adequacy, pertinent labs, bowel habits, wt changes, and clinical progress    Nutrition Assessment:  Pt admitted s/p syncope and fall. Hx of DM, HLD, reflux and Afib. Pt nutritionally at risk AEB low BMI and fluctuating PO intakes. Pt reports good appetite and PO intakes this AM, consumed 51-75% per EMR. Denies recent weight loss, CASSIDY d/t no weight obtained this admission. Will add ONS for optimal nutrition this admission. Malnutrition Assessment:  Malnutrition Status: At risk for malnutrition (Comment)(?Natural aging vs. Malnutrition)      Estimated Daily Nutrient Needs:  Energy (kcal):  4150-5720 kcal; Weight Used for Energy Requirements:  Ideal(57 kg)     Protein (g):  57-69 g; Weight Used for Protein Requirements:  Ideal(1.0-1.2 g/kg)        Fluid (ml/day):   ; Method Used for Fluid Requirements:  1 ml/kcal      Nutrition Related Findings:  Appears frail. Active BS. Upper dentures. [place]. BG stable. Wounds:  None       Current Nutrition Therapies:    DIET GENERAL;     Anthropometric Measures:  · Height: 5' 5\" (165.1 cm)  · Current Body Weight: 114 lb (51.7 kg)   · Ideal Body Weight: 125 lbs; % Ideal Body Weight 91.2 %   · BMI: 19  · BMI Categories: Underweight (BMI less than 22) age over 72       Nutrition Diagnosis:   · Predicted inadequate energy intake related to inadequate protein-energy intake as evidenced by BMI, intake 0-25%      Nutrition Interventions:   Food and/or Nutrient Delivery:  Continue Current Diet, Start Oral Nutrition Supplement  Nutrition Education/Counseling:  No recommendation at this time   Coordination of Nutrition Care:  Continue to monitor while inpatient    Goals:  Pt will consume greater than 50% of meals and ONS this admission Nutrition Monitoring and Evaluation:   Food/Nutrient Intake Outcomes:  Food and Nutrient Intake, Supplement Intake  Physical Signs/Symptoms Outcomes:  Weight, Nutrition Focused Physical Findings     Discharge Planning:    Continue current diet     Electronically signed by Emma Mcmahon MS, RD, LD on 1/7/21 at 10:32 AM EST    Contact: [phone]

## 2021-01-07 NOTE — PROGRESS NOTES
Orthostatics:    BP Lyin/57 Pulse: 65    BP sittin/53 Pulse: 72    BP Standin/45 Pulse: 68    Patient denied dizziness but was very unsteady when standing. MD made aware. Will monitor for further orders.

## 2021-01-07 NOTE — PROGRESS NOTES
Occupational Therapy  Facility/Department: [place] [address] [facility]  Daily Treatment Note  NAME: Livan Merchant  : 1924  MRN: [id number]    Date of Service: 2021    Discharge Recommendations:  Subacute/Skilled Nursing Facility       Assessment   Performance deficits / Impairments: Decreased functional mobility ; Decreased safe awareness;Decreased endurance;Decreased balance;Decreased cognition  Assessment: Pt Ta for mobility and transfers, cues for environment and walker mgmt d/t vision impairments. OT Education: OT Role;Plan of Care;Transfer Training;ADL Adaptive Strategies  Patient Education: disease specific: importance of use of nurse call light for assist with transfers/ADL needs  REQUIRES OT FOLLOW UP: Yes  Safety Devices  Safety Devices in place: Yes  Type of devices: Call light within reach;Nurse notified; Chair alarm in place; Left in chair;Gait belt         Patient Diagnosis(es): The primary encounter diagnosis was Syncope and collapse. Diagnoses of Rib pain on left side and Orthostasis were also pertinent to this visit. has a past medical history of Atrial fibrillation ([address] Colon polyps, Depression, Diabetes mellitus ([address] Diverticula of colon, Elbow injury, Esophageal reflux, Glaucoma, Hearing loss, Hyperlipidemia, Insomnia, Osteoporosis, PAT (paroxysmal atrial tachycardia) ([address] Rhinitis, allergic, and Wears glasses. has a past surgical history that includes Cholecystectomy; Colonoscopy; Hysterectomy; Tonsillectomy; Appendectomy; Cataract removal with implant (11); and Cataract removal with implant (t). Restrictions  Restrictions/Precautions  Restrictions/Precautions: Fall Risk, General Precautions  Position Activity Restriction  Other position/activity restrictions:  Up with assist, telemetry, high fall risk  Subjective   General  Chart Reviewed: Yes  Patient assessed for rehabilitation services?: Yes  Family / Caregiver Present: No  Referring Practitioner: Dr. Marie Penaloza Diagnosis: syncope & collapse, fall with Left side rib pain  Subjective  Subjective: \"I wasn't sleeping\"  General Comment  Comments: RN cleared pt for OT eval; pt sitting on toilet with PCA  Vital Signs  Patient Currently in Pain: No   Orientation  Orientation  Overall Orientation Status: Within Functional Limits  Objective    ADL  Feeding: Setup;Minimal assistance(for cutting and placement d/t legally blind.)  LE Dressing: Maximum assistance        Balance  Sitting Balance: Supervision  Standing Balance: Minimal assistance  Functional Mobility  Functional - Mobility Device: Rolling Walker  Activity: Other(hallway)  Assist Level: Minimal assistance  Functional Mobility Comments: cues for environement and guiding d/t visual impairement  Bed mobility  Supine to Sit: Moderate assistance  Sit to Supine: Unable to assess(up to chair at end of session)  Transfers  Sit to stand: Minimal assistance;2 Person assistance(up to RW)  Stand to sit: Minimal assistance;2 Person assistance            Plan   Plan  Times per week: 3-4x/ week  Current Treatment Recommendations: Endurance Training, Balance Training, Functional Mobility Training, Safety Education & Training, Positioning, Self-Care / ADL    Goals  Short term goals  Time Frame for Short term goals: 1 week(1-12-21)  Short term goal 1: min assist with bathroom mobility with RW by 1-12-21  Short term goal 2: CGA with standing ADL's for 1-2 minutes  Short term goal 3: tolerate sitting up in chair for meals for 30 minutes  Short term goal 4: set up only for UE self care by 1-10-21  Patient Goals   Patient goals : go home       Therapy Time   Individual Concurrent Group Co-treatment   Time In       1550   Time Out       1630   Minutes       40   Timed Code Treatment Minutes: 40 Minutes       ELAN Underwood/L  If pt is unable to be seen after this session, please let this note serve as discharge summary. Please see case management note for discharge disposition. Thank you.

## 2021-01-07 NOTE — PLAN OF CARE
Pt remained free of falls. Call light within reach. Bed alarm on. Non skid footwear in place. Bed locked and in lowest position.

## 2021-01-07 NOTE — CONSULTS
[address]                                  CONSULTATION    PATIENT NAME: Lula Martínez                      :        1924  MED REC NO:   [id number]                          ROOM:       0104  ACCOUNT NO:   [de-identified]                           ADMIT DATE: 2021  PROVIDER:     Radha Ortiz MD    CONSULT DATE:  2021    CARDIAC ELECTROPHYSIOLOGY CONSULTATION    REASON FOR CONSULTATION:  Syncope. HISTORY OF PRESENT ILLNESS:  The patient is a 71-year-old woman with the  history of diabetes, paroxysmal atrial fibrillation and frequent falls,  who presents after an episode of syncope at the [facility]  on 2021. The patient is unable to provide any meaningful history  with regard to this event. Per the record, the patient fell on the  evening of 2021 while on the walk-in shower. She was attended to,  but refused to go to the hospital at that time. She presented to the  hospital on 2021 with some right rib and flank discomfort. At the  time of admission, she was found to be in sinus bradycardia, which has  been persistent. Her orthostatic blood pressure measurements  demonstrates persistent orthostatic hypotension with standing blood  pressures of 72 mmHg, 98 mmHg, and 86 mmHg on consecutive recordings. She has a history of paroxysmal atrial fibrillation and has been on  long-acting propafenone for an unknown period of time. At the time of  admission, she was taking Rythmol  mg q. 8 hours. PAST MEDICAL HISTORY:  1.  Diabetes mellitus. 2.  Hyperlipidemia. 3.  Paroxysmal atrial fibrillation. 4.  Recurrent falls. 5.  Memory loss.     MEDICATIONS:  At the time of admission include Norvasc 5 mg p.o. q.h.s.,  aspirin 81 mg p.o. q.d., Neurontin 100 mg p.o. q.d., hydroxyurea 500 mg  p.o. q.d., Toprol XL 25 mg p.o. q.d., Rythmol  mg p.o. q. 8 hours, and Zocor 20 mg p.o. q.d. ALLERGIES:  Include ACTONEL and AMOXICILLIN. SOCIAL HISTORY:  The patient does not smoke cigarettes. She is a former  smoker, having stopped smoking 45 years ago. She consumes alcohol on  only an occasional basis. FAMILY HISTORY:  Remarkable for stroke in the father. There is a  history of diabetes in one male sibling. No known family history of  cardiac rhythm disturbance, syncope or sudden cardiac death. REVIEW OF SYSTEMS:  Difficult to obtain as the patient has trouble with  her memory, but she denies episodes of chest pain or palpitations. Her  functional status is limited, but she does report that she walks in the  hallway to a certain extent and is able to sit up for prolonged period  of time. All other components of the 14-system review are negative. PHYSICAL EXAMINATION:  VITAL SIGNS:  Blood pressure is 120/57 in the supine position and 86/45  in the standing position. HEENT:  Exam demonstrates normocephalic and atraumatic head. There is  no scleral icterus. Pupils are round and reactive. NECK:  Supple without thyromegaly. There is no cervical  lymphadenopathy. LUNGS:  Clear to auscultation. CARDIOVASCULAR:  Exam reveals a regular bradycardic rhythm. The apical  impulse is discrete. S1 and S2 are normal.  There is no audible murmur  or gallop. The jugular venous pressure is low. ABDOMEN:  The abdomen is mildly obese, soft and nontender. EXTREMITIES:  Demonstrate no pitting pretibial dependent edema. There  is no cyanosis. There is no evidence for chronic venous stasis. SKIN:  Otherwise, warm and dry without skin rash. DIAGNOSTIC DATA:  A 12-lead ECG from 01/07/2021 demonstrates sinus  rhythm at 56 beats per minute. There are nonspecific ST segment and  T-wave abnormalities. IMPRESSIONS:  1. Orthostatic hypotension. 2.  Sinus bradycardia. 3.  Paroxysmal atrial fibrillation. 4.  Memory loss. 5.  Recurrent falls. The patient presents after a fall about 48 hours prior to admission. At  the time of admission, she was found to have sinus bradycardia and  fairly persistent orthostatic hypotension. It is likely, though not  certain, that her falls are related to orthostatic hypotension. On that  basis, I would be inclined to discontinue the amlodipine altogether and  probably discontinue the Toprol as well. We will decrease the Rythmol  SR to b.i.d., which is the recommended dosing interval.  Based on her  low jugular venous pressure, she could tolerate more fluid  administration. A formal PT evaluation for strength, stability, and  balance would also be a benefit. RECOMMENDATIONS:  1. Discontinue amlodipine. 2.  Discontinue Toprol. 3.  Reduce Rythmol SR to 225 mg q. 12 hours. 4.  Increase fluid administration. 5.  Formal physical therapy consultation for gait stability, strength,  and balance. Thanks for the opportunity to assist in the care of the patient. Please  contact me, if you have any questions regarding her evaluation.         Jean Etienne MD    D: 01/07/2021 13:31:31       T: 01/07/2021 15:03:25     [email or web address]  Job#: [id number]     Doc#: [id number]    CC:

## 2021-01-07 NOTE — PROGRESS NOTES
Orthostatics BP:    BP Lyin/62   Pulse: 56    BP Sittin/46   Pulse: 62    BP Standin/55   Pulse: 66    Patient denies dizziness but remains unsteady while standing. Will continue to monitor.

## 2021-01-07 NOTE — PROGRESS NOTES
Skin: Skin color, texture, turgor normal.  No rashes or lesions. Neurologic:  Neurovascularly intact without any focal sensory/motor deficits. Cranial nerves: II-XII intact, grossly non-focal.  Psychiatric:  Alert and oriented, thought content appropriate, normal insight  Capillary Refill: Brisk,< 3 seconds   Peripheral Pulses: +2 palpable, equal bilaterally     Labs:   No results for input(s): WBC, HGB, HCT, PLT in the last 72 hours. No results for input(s): NA, K, CL, CO2, BUN, CREATININE, CALCIUM, PHOS in the last 72 hours. Invalid input(s): MAGNES  No results for input(s): AST, ALT, BILIDIR, BILITOT, ALKPHOS in the last 72 hours. No results for input(s): INR in the last 72 hours. No results for input(s): [place] in the last 72 hours. Urinalysis:      Lab Results   Component Value Date    NITRU Negative 01/04/2021    BLOODU Negative 01/04/2021    SPECGRAV 1.020 01/04/2021    GLUCOSEU Negative 01/04/2021       Radiology:  CT HEAD WO CONTRAST   Final Result   No traumatic abnormality. CT CERVICAL SPINE WO CONTRAST   Final Result   No traumatic abnormality. CT CHEST WO CONTRAST   Final Result   Remote nondisplaced left posterolateral 10th and rib fractures. No acute rib   fracture demonstrated. Stable remote compression fractures in the   midthoracic spine also noted. Several new subcentimeter nodules in the right upper lobe most of which are   grouped together and are most likely infectious or inflammatory in etiology. Follow-up chest CT in 3 months may be considered. XR HAND RIGHT (MIN 3 VIEWS)   Final Result   Negative for fracture         XR TIBIA FIBULA RIGHT (2 VIEWS)   Final Result   Negative         XR CHEST PORTABLE   Final Result   No x-ray evidence of acute trauma to the chest or acute cardiopulmonary   disease.                  Assessment/Plan:    Active Hospital Problems    Diagnosis    Syncope and collapse [R55] Syncope- unclear etiology, ekg noted sinus cesilia, also with + orthostatic hypotension, Ct head/neck were unremarkable  -checked echo and unremarkable  -tele  -ivfs given  -Check am orthostatics was +(down to 15X systolic on 1/6)   -prn meclizine ordered  -cardiac monitor on dc   -cards consulted(for possible autonomic dysfcn)    Chest pain- likely from prior Rib fx(noted on CT however remote) or msk strain  -supportive care     Afib- per emr, sinus on ekg on admission  -on Asa,bb,   -rythmol dose adjusted by cards     Dm2- per emr  -ac/hs bs     Glaucoma- continued timolol, trusport     htn- held home norvasc given above        DVT Prophylaxis: lovenox  Diet: DIET GENERAL;  Dietary Nutrition Supplements: Frozen Oral Supplement  Code Status: DNR-CC    PT/OT Eval Status: rec snf    Dispo - cards consulted, start dejan Garcia MD

## 2021-01-07 NOTE — PROGRESS NOTES
Physical Therapy  Facility/Department: [place] [address] [facility]  Daily Treatment Note  NAME: Felipe Franklin  : 1924  MRN: [id number]    Date of Service: 2021    Discharge Recommendations:  Subacute/Skilled Nursing Facility   PT Equipment Recommendations  Equipment Needed: No    Assessment   Body structures, Functions, Activity limitations: Decreased functional mobility ; Decreased posture;Decreased endurance;Decreased strength;Decreased balance;Decreased safe awareness; Increased pain  Assessment: Pt participated well with PT this afternoon, ambulating increased distance with RW and requiring slightly less assist for bed mobility & transfers. Mod verbal/visual cues needed for direction given pt's visual deficits. Continue to recommend SNF at D/C. Treatment Diagnosis: Decreased independence with mobility  Specific instructions for Next Treatment: Progress mobility as tolerated  Prognosis: Fair  Decision Making: Medium Complexity  PT Education: Goals;Transfer Training;Disease Specific Education;PT Role;Functional Mobility Training;Plan of Care;General Safety;Precautions; Equipment;Gait Training  Patient Education: Disease-specific education: Pt educated on compensatory transfer/gait strategies with RW; pt verbalizes understanding. Barriers to Learning: Impaired cognition, [place], visual deficits  REQUIRES PT FOLLOW UP: Yes  Activity Tolerance: Patient Tolerated treatment well;Patient limited by fatigue     Patient Diagnosis(es): The primary encounter diagnosis was Syncope and collapse. Diagnoses of Rib pain on left side and Orthostasis were also pertinent to this visit. has a past medical history of Atrial fibrillation ([address] Colon polyps, Depression, Diabetes mellitus ([address] Diverticula of colon, Elbow injury, Esophageal reflux, Glaucoma, Hearing loss, Hyperlipidemia, Insomnia, Osteoporosis, PAT (paroxysmal atrial tachycardia) ([address] Rhinitis, allergic, and Wears glasses. has a past surgical history that includes Cholecystectomy; Colonoscopy; Hysterectomy; Tonsillectomy; Appendectomy; Cataract removal with implant (1/17/11); and Cataract removal with implant (t). Restrictions  Restrictions/Precautions  Restrictions/Precautions: Fall Risk, General Precautions  Position Activity Restriction  Other position/activity restrictions: Up with assist, telemetry, high fall risk  Subjective   General  Chart Reviewed: Yes  Response To Previous Treatment: Patient with no complaints from previous session. Family / Caregiver Present: No  Referring Practitioner: Dr. Selvin Damon: Pt agreeable to work with PT/OT this afternoon, pleasant and cooperative. States she'd like to take a walk. General Comment  Comments: Pt resting in bed upon entry of therapy staff  Pain Screening  Patient Currently in Pain: Denies    Orientation  Orientation  Overall Orientation Status: Within Functional Limits    Objective   Bed mobility  Supine to Sit: Moderate assistance(moving toward R side, HOB elevated ~30 degrees)  Scooting: Moderate assistance(to scoot forward to EOB)     Transfers  Sit to Stand: 2 Person Assistance(Ta x 2 from EOB to RW)  Stand to sit: Minimal Assistance  Bed to Chair: Minimal assistance(using RW)     Ambulation  Surface: level tile  Device: Rolling Walker  Assistance: Contact guard assistance  Quality of Gait: Pt amb with slow janes using RW for support. Mod verbal/tactile cues needed for direction given pt's visual impairments. Mildly unsteady although no LOB.   Gait Deviations: Slow Janes;Decreased step length;Decreased step height  Distance: x 110 feet     Balance  Posture: Fair  Sitting - Static: Good  Sitting - Dynamic: Good;-  Standing - Static: Good;-  Standing - Dynamic: Fair;+     AM-PAC Score  AM-PAC Inpatient Mobility without Stair Climbing Raw Score : 15 (01/07/21 1647)  AM-PAC Inpatient without Stair Climbing T-Scale Score : 43.03 (01/07/21 1647) Mobility Inpatient CMS 0-100% Score: 47.43 (01/07/21 1647)  Mobility Inpatient without Stair CMS G-Code Modifier : CK (01/07/21 1647)    Goals  Short term goals  Time Frame for Short term goals: 1/12/21  Short term goal 1: Pt will complete bed mobility with min A. Short term goal 2: Pt will complete sit<>stands with RW and min A. Short term goal 3: Pt will complete bed<>chair with RW and min A - MET 1/07/21  Short term goal 4: Pt will ambulate 15 ft with RW and min A - MET 1/07/21. Goal upgraded: Pt will ambulate x 150 feet using RW with CGA/SBA x 1. Short term goal 5: Pt will tolerate 12-15 reps of LE exercises for strengthening and balance by 1/7/20 - not yet met as of 1/07/21, goal ongoing  Patient Goals   Patient goals : \"get better\"    Plan    Times per week: 3-5x/week in acute care  Times per day: Daily  Plan weeks: 1/12/20  Specific instructions for Next Treatment: Progress mobility as tolerated  Current Treatment Recommendations: Strengthening, Transfer Training, Endurance Training, Patient/Caregiver Education & Training, Balance Training, Gait Training, Home Exercise Program, Functional Mobility Training, Safety Education & Training  Safety Devices: All fall risk precautions in place, Patient at risk for falls, Call light within reach, Gait belt, Nurse notified, Left in chair, Chair alarm in place    Therapy Time   Individual Concurrent Group Co-treatment   Time In 1550         Time Out 1630         Minutes 40         Timed Code Treatment Minutes: [place], [place] #[id number]    If pt is unable to be seen after this session, please let this note serve as discharge summary. Please see case management note for discharge disposition. Thank you.

## 2021-01-07 NOTE — PROGRESS NOTES
Assessment completed and documented. VSS. A/ox4. Denies pain unless moving. x1 assist to bathroom. Bed locked and in lowest position. Bedside table and call light within reach. Denies further needs at this time. Spoke to NP KYLE [facility] about bladder scan of 318. No orders needed.

## 2021-01-08 VITALS
HEART RATE: 61 BPM | WEIGHT: 114 LBS | OXYGEN SATURATION: 96 % | SYSTOLIC BLOOD PRESSURE: 122 MMHG | RESPIRATION RATE: 16 BRPM | TEMPERATURE: 97.8 F | HEIGHT: 65 IN | DIASTOLIC BLOOD PRESSURE: 69 MMHG | BODY MASS INDEX: 18.99 KG/M2

## 2021-01-08 PROBLEM — I95.1 ORTHOSTATIC HYPOTENSION: Status: ACTIVE | Noted: 2021-01-08

## 2021-01-08 LAB
GLUCOSE BLD-MCNC: 108 MG/DL (ref 70–99)
GLUCOSE BLD-MCNC: 132 MG/DL (ref 70–99)
PERFORMED ON: ABNORMAL
PERFORMED ON: ABNORMAL
SARS-COV-2, NAAT: NOT DETECTED

## 2021-01-08 PROCEDURE — U0002 COVID-19 LAB TEST NON-CDC: HCPCS

## 2021-01-08 PROCEDURE — 99232 SBSQ HOSP IP/OBS MODERATE 35: CPT | Performed by: NURSE PRACTITIONER

## 2021-01-08 PROCEDURE — 2580000003 HC RX 258: Performed by: INTERNAL MEDICINE

## 2021-01-08 PROCEDURE — 6370000000 HC RX 637 (ALT 250 FOR IP): Performed by: INTERNAL MEDICINE

## 2021-01-08 PROCEDURE — 6360000002 HC RX W HCPCS: Performed by: INTERNAL MEDICINE

## 2021-01-08 RX ORDER — PROPAFENONE HYDROCHLORIDE 225 MG/1
225 CAPSULE, EXTENDED RELEASE ORAL EVERY 12 HOURS SCHEDULED
Qty: 60 CAPSULE | Refills: 0
Start: 2021-01-08

## 2021-01-08 RX ORDER — LIDOCAINE 4 G/G
1 PATCH TOPICAL DAILY
Qty: 5 PATCH | Refills: 0 | Status: SHIPPED | OUTPATIENT
Start: 2021-01-08 | End: 2021-01-13

## 2021-01-08 RX ORDER — GABAPENTIN 100 MG/1
100 CAPSULE ORAL 2 TIMES DAILY
Qty: 10 CAPSULE | Refills: 0 | Status: SHIPPED | OUTPATIENT
Start: 2021-01-08 | End: 2021-01-13

## 2021-01-08 RX ADMIN — PROPAFENONE HYDROCHLORIDE 225 MG: 225 CAPSULE, EXTENDED RELEASE ORAL at 08:59

## 2021-01-08 RX ADMIN — SODIUM CHLORIDE, PRESERVATIVE FREE 10 ML: 5 INJECTION INTRAVENOUS at 09:00

## 2021-01-08 RX ADMIN — TIMOLOL MALEATE 1 DROP: 5 SOLUTION OPHTHALMIC at 09:00

## 2021-01-08 RX ADMIN — DORZOLAMIDE HYDROCHLORIDE 1 DROP: 20 SOLUTION/ DROPS OPHTHALMIC at 09:00

## 2021-01-08 RX ADMIN — ENOXAPARIN SODIUM 30 MG: 30 INJECTION SUBCUTANEOUS at 09:00

## 2021-01-08 RX ADMIN — ASPIRIN 81 MG: 81 TABLET, COATED ORAL at 08:59

## 2021-01-08 RX ADMIN — GABAPENTIN 100 MG: 100 CAPSULE ORAL at 08:59

## 2021-01-08 ASSESSMENT — PAIN DESCRIPTION - ORIENTATION: ORIENTATION: LEFT

## 2021-01-08 ASSESSMENT — PAIN SCALES - GENERAL: PAINLEVEL_OUTOF10: 1

## 2021-01-08 NOTE — DISCHARGE SUMMARY
Report called to KENDRICK Machado at facility. Patient is alert and oriented. Patient belongings packed up and sent with her. IV removed. No complications.   Discharge packet handed to PTS for delivery

## 2021-01-08 NOTE — PROGRESS NOTES
Patient assessment complete and charted. VSS. AOx4. Wu hose placed on patient. Bed locked and in lowest position. Non-skid socks in place. Call light within reach. Bed alarm on. Patient states no further needs at this time. Will continue to monitor.

## 2021-01-08 NOTE — DISCHARGE SUMMARY
Hospital Medicine Discharge Summary    Patient ID: Mindy Carlisle      Patient's PCP: Nii Wilcox MD    Admit Date: 1/4/2021     Discharge Date: 1/8/2021      Admitting Physician: Balta Lopez MD     Discharge Physician: Balta Lopez MD     Discharge Diagnoses: Active Hospital Problems    Diagnosis    Orthostatic hypotension [I95.1]    Syncope and collapse [R55]    HTN (hypertension), benign [I10]    PAF (paroxysmal atrial fibrillation) (HCC) [I48.0]       The patient was seen and examined on day of discharge and this discharge summary is in conjunction with any daily progress note from day of discharge. Hospital Course:   History Of Present Illness:  (pt is poor historian)   80 y.o. female with dm2, afib, glaucoma who presented to [facility] with fall. Pt lives in assisted living at [facility] and apparently had a fall this weekend( she cannot remember which day). She recalls being in the bathroom and having/seeing something and then next finding herself on the ground. She denies sob/cp/abd pain currently. She had dec'd appetite of late. She came in today via EMS.   -Per daughter, pt fell Saturday evening while in the walk-in shower, pulled cord , causing aides to come(who then called EMS due to trouble getting pt up)  -Pt refused to go to hospital at the time  -dainther saw her on Sunday and note pt neded help to get up and yelled out in pain  By today, unable to get up out of bed for meals per aides, so brought in by EMS         Syncope- unclear etiology, ekg noted sinus cesilia, also with + orthostatic hypotension, Ct head/neck were unremarkable  -checked echo and unremarkable  -tele  -ivfs given  -Check am orthostatics was +(down to 61S systolic on 1/6)   -prn meclizine ordered  -cardiac monitor on dc   -cards consulted(for possible autonomic dysfcn), stopped bb, stopped norvasc, reduced rythmol dose   -started dejan hose  -consider florinef as outpt -arranged cardiac monitor on dc    Chest pain- likely from prior Rib fx(noted on CT however remote) or msk strain  -supportive care     parox Afib- per emr, sinus on ekg on admission  -on Asa,bb,   -rythmol dose adjusted/reduced by cards   -no AC per pt's primary cardiologist due to hx of falls    Dm2- per emr  -ac/hs bs     Glaucoma- continued timolol, trusport     htn- held home norvasc given above, cards did not continue on dc       Physical Exam Performed:     /69   Pulse 61   Temp 97.8 °F (36.6 °C) (Oral)   Resp 16   Ht 5' 5\" (1.651 m)   Wt 114 lb (51.7 kg)   SpO2 96%   BMI 18.97 kg/m²        General appearance:  No apparent distress, appears stated age and cooperative. Frail appearing  HEENT:  Normal cephalic, atraumatic without obvious deformity. Pupils equal, round, and reactive to light.  Extra ocular muscles intact. Conjunctivae/corneas clear. [facility]  Neck: Supple, with full range of motion. No jugular venous distention. Trachea midline. Respiratory:  Normal respiratory effort. Clear to auscultation, bilaterally without Rales/Wheezes/Rhonchi. Cardiovascular:  Regular rate and rhythm with normal S1/S2 without murmurs, rubs or gallops. Abdomen: Soft, non-tender, non-distended with normal bowel sounds. Musculoskeletal:  No clubbing, cyanosis or edema bilaterally.  Full range of motion without deformity. Skin: Skin color, texture, turgor normal.  No rashes or lesions. Neurologic:  Neurovascularly intact without any focal sensory/motor deficits. Cranial nerves: II-XII intact, grossly non-focal.  Psychiatric:  Alert and oriented, thought content appropriate, normal insight  Capillary Refill: Brisk,< 3 seconds   Peripheral Pulses: +2 palpable, equal bilaterally       Labs:  For convenience and continuity at follow-up the following most recent labs are provided:      CBC:    Lab Results   Component Value Date    WBC 5.4 01/04/2021    HGB 9.9 01/04/2021    HCT 29.9 01/04/2021     01/04/2021 Renal:    Lab Results   Component Value Date     01/04/2021    K 4.1 01/04/2021    K 4.5 10/02/2019     01/04/2021    CO2 25 01/04/2021    BUN 15 01/04/2021    CREATININE 1.1 01/04/2021    CALCIUM 8.7 01/04/2021    PHOS 3.9 05/30/2019         Significant Diagnostic Studies    Radiology:   CT HEAD WO CONTRAST   Final Result   No traumatic abnormality. CT CERVICAL SPINE WO CONTRAST   Final Result   No traumatic abnormality. CT CHEST WO CONTRAST   Final Result   Remote nondisplaced left posterolateral 10th and rib fractures. No acute rib   fracture demonstrated. Stable remote compression fractures in the   midthoracic spine also noted. Several new subcentimeter nodules in the right upper lobe most of which are   grouped together and are most likely infectious or inflammatory in etiology. Follow-up chest CT in 3 months may be considered. XR HAND RIGHT (MIN 3 VIEWS)   Final Result   Negative for fracture         XR TIBIA FIBULA RIGHT (2 VIEWS)   Final Result   Negative         XR CHEST PORTABLE   Final Result   No x-ray evidence of acute trauma to the chest or acute cardiopulmonary   disease.                 Consults:     IP CONSULT TO HOSPITALIST  IP CONSULT TO CARDIOLOGY    Disposition:  SNF     Condition at Discharge: Stable    Discharge Instructions/Follow-up:  Cards([facility]) as outpt, deveniinue dejan hose    Code Status:  DNR-CC     Activity: activity as tolerated    Diet: regular diet      Discharge Medications:     Discharge Medication List as of 1/8/2021  2:02 PM           Details   lidocaine 4 % external patch Place 1 patch onto the skin daily for 5 days, Transdermal, DAILY Starting Fri 1/8/2021, Until Wed 1/13/2021, For 5 days, Disp-5 patch, R-0, Print      meclizine (ANTIVERT) 12.5 MG tablet Take 1 tablet by mouth 3 times daily as needed for Dizziness, Disp-5 tablet, R-0NO PRINT              Details propafenone (RYTHMOL SR) 225 MG extended release capsule Take 1 capsule by mouth every 12 hours, Disp-60 capsule, R-0NO PRINT      gabapentin (NEURONTIN) 100 MG capsule Take 1 capsule by mouth 2 times daily for 5 days. , Disp-10 capsule, R-0Print              Details   dorzolamide (TRUSOPT) 2 % ophthalmic solution Apply to eyeHistorical Med      acetaminophen (TYLENOL) 500 MG tablet Take 500 mg by mouthHistorical Med      hydroxyurea (HYDREA) 500 MG chemo capsule Historical Med      Multiple Vitamin (MULTIVITAMIN) tablet Take 1 tablet by mouthHistorical Med      simvastatin (ZOCOR) 20 MG tablet Take 1 tablet by mouth nightly, Disp-30 tablet, R-3Print      saline nasal gel (AYR) GEL by Nasal route as needed for Congestion, Nasal, PRN, Until Discontinued, Historical Med      timolol (TIMOPTIC-XE) 0.5 % ophthalmic gel-forming Place 1 drop into both eyes daily      aspirin 81 MG EC tablet Take 81 mg by mouth daily. Time Spent on discharge is more than 30 minutes in the examination, evaluation, counseling and review of medications and discharge plan. Signed:    Shalini Chris MD   1/8/2021      Thank you Nicolasa Silverman MD for the opportunity to be involved in this patient's care. If you have any questions or concerns please feel free to contact me at [phone].

## 2021-01-08 NOTE — DISCHARGE INSTR - DIET

## 2021-01-08 NOTE — PROGRESS NOTES
 promethazine (PHENERGAN) tablet 12.5 mg  12.5 mg Oral Q6H PRN Gina Ivan MD        Or    ondansetron [facility]) injection 4 mg  4 mg Intravenous Q6H PRN Gina Ivan MD        polyethylene glycol [facility]) packet 17 g  17 g Oral Daily PRN Gina Ivan MD        acetaminophen (TYLENOL) tablet 650 mg  650 mg Oral Q6H PRN Gina Ivan MD        Or   [facility] acetaminophen (TYLENOL) suppository 650 mg  650 mg Rectal Q6H PRN Gina Ivan MD        dorzolamide (TRUSOPT) 2 % ophthalmic solution 1 drop  1 drop Ophthalmic TID Gina Ivan MD   1 drop at 01/07/21 2117    lidocaine 4 % external patch 1 patch  1 patch Transdermal Daily Gina Ivan MD   1 patch at 01/07/21 0841    gabapentin (NEURONTIN) capsule 100 mg  100 mg Oral BID Gina Ivan MD   100 mg at 01/07/21 2114    perflutren lipid microspheres (DEFINITY) injection 1.65 mg  1.5 mL Intravenous ONCE PRN Gina Ivan MD        atorvastatin (LIPITOR) tablet 10 mg  10 mg Oral Nightly Gina Ivan MD   10 mg at 01/07/21 2114       Objective:     Telemetry monitor: sinus    Physical Exam:  Constitutional and general appearance: alert, cooperative, no distress and appears stated age; + frail  HEENT: PERRL, no cervical lymphadenopathy. No masses palpable.  Normal oral mucosa  Respiratory:  · Normal excursion and expansion without use of accessory muscles  · Resp auscultation: Normal breath sounds without wheezing, rhonchi, and rales  Cardiovascular:  · The apical impulse is not displaced  · Heart tones are crisp and normal. regular S1 and S2.  · Jugular venous pulsation Normal  · The carotid upstroke is normal in amplitude and contour without delay or bruit  · Peripheral pulses are symmetrical and full   Abdomen:  · No masses or tenderness  · Bowel sounds present  Extremities:  ·  No cyanosis or clubbing  ·  No lower extremity edema  ·  Skin: warm and dry  Neurological:  · Alert and oriented · Moves all extremities well  · No abnormalities of mood, affect, memory, mentation, or behavior are noted    Data  EKG 1/7/2021:   SB HR 56    Echo 1/5/2021:  Normal left ventricular systolic function with ejection fraction of 55-60%. No regional wall motion abnormalites are seen. Mild concentric left ventricular hypertrophy. Grade I diastolic dysfunction with normal filling pressure. Systolic pulmonic artery pressure (SPAP) is normal estimated at 20 mmHg  (Right atrial pressure of 3 mmHg). Echo 10/16/2019 [facility]):  - Left ventricle: The cavity size is normal. Wall thickness is normal. Systolic function was normal. The estimated ejection fraction was in the range of 54% to 58%. Wall motion was normal; there were no regional wall motion abnormalities. Normal diastolic function. The global longitudinal strain was -15%. - Aortic valve: Thickening, consistent with sclerosis. The peak systolic gradient is 24MR Hg.  - Mitral valve: There was mild regurgitation.  - Inferior vena cava: The vessel was normal in size; the respirophasic diameter changes were in the normal range (&gt;= 50%); findings are consistent with normal central venous pressure. All labs and testing reviewed.   Lab Review     Renal Profile:   Lab Results   Component Value Date    CREATININE 1.1 01/04/2021    BUN 15 01/04/2021     01/04/2021    K 4.1 01/04/2021    K 4.5 10/02/2019     01/04/2021    CO2 25 01/04/2021     CBC:    Lab Results   Component Value Date    WBC 5.4 01/04/2021    RBC 2.30 01/04/2021    HGB 9.9 01/04/2021    HCT 29.9 01/04/2021    .8 01/04/2021    RDW 15.7 01/04/2021     01/04/2021     BNP:  No results found for: BNP  Fasting Lipid Panel:    Lab Results   Component Value Date    CHOL 134 05/30/2019    HDL 71 05/30/2019    HDL 69 09/12/2011    TRIG 56 05/30/2019     Cardiac Enzymes:  CK/MbTroponin  Lab Results   Component Value Date    TROPONINI <0.01 01/04/2021     PT/ INR   Lab Results Component Value Date    INR 1.06 05/29/2019    INR 0.96 04/13/2013    INR 0.89 02/14/2012    PROTIME 12.1 05/29/2019    PROTIME 11.0 04/13/2013    PROTIME 9.6 02/14/2012     PTT No results found for: PTT   Lab Results   Component Value Date    MG 2.30 10/02/2019      Lab Results   Component Value Date    TSH 3.07 09/11/2011       Assessment:  Orthostatic hypotension: improving  HTN: controlled    Syncope: reported prior to admission in the setting of orthostatic hypotension  Sinus bradycardia: stable   Paroxysmal atrial fibrillation: stable    -on Rythmol prior to admission    -[id number] score 7 (age, gender, TIA, HTN, DM)  Chronic macrocytic anemia: stable   History of TIA  Frequent falls  Memory loss     Plan:   1. Continue Rythmol SR 225mg po BID   2. Toprol was stopped 1/7/2021  3. Would not resume amlodipine on discharge   4. Patient is not anticoagulated per her primary cardiologist due to history of frequent falls  5. Compression stockings (20-30mmHg)  6. Increase water intake     EP will sign off but remains available if needed. Patient to follow up with [facility] after discharge.      Ambrocio Cramer, APRN-CNP  [address]  [phone]

## 2021-01-27 ENCOUNTER — HOSPITAL ENCOUNTER (EMERGENCY)
Age: 86
Discharge: NURSING, REHAB OR LONG-TERM CARE | End: 2021-01-28
Attending: EMERGENCY MEDICINE
Payer: MEDICARE

## 2021-01-27 ENCOUNTER — APPOINTMENT (OUTPATIENT)
Dept: GENERAL RADIOLOGY | Age: 86
End: 2021-01-27
Payer: MEDICARE

## 2021-01-27 ENCOUNTER — APPOINTMENT (OUTPATIENT)
Dept: CT IMAGING | Age: 86
End: 2021-01-27
Payer: MEDICARE

## 2021-01-27 DIAGNOSIS — U07.1 COVID-19 VIRUS INFECTION: ICD-10-CM
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
## 2021-01-27 DIAGNOSIS — R15.9 INCONTINENCE OF FECES, UNSPECIFIED FECAL INCONTINENCE TYPE: ICD-10-CM

## 2021-01-27 DIAGNOSIS — R55 SYNCOPE, UNSPECIFIED SYNCOPE TYPE: Primary | ICD-10-CM

## 2021-01-27 LAB
A/G RATIO: 1 (ref 1.1–2.2)
ALBUMIN SERPL-MCNC: 3.4 G/DL (ref 3.4–5)
ALP BLD-CCNC: 77 U/L (ref 40–129)
ALT SERPL-CCNC: 10 U/L (ref 10–40)
ANION GAP SERPL CALCULATED.3IONS-SCNC: 9 MMOL/L (ref 3–16)
AST SERPL-CCNC: <5 U/L (ref 15–37)
BASOPHILS ABSOLUTE: 0 K/UL (ref 0–0.2)
BASOPHILS RELATIVE PERCENT: 0.3 %
BILIRUB SERPL-MCNC: 0.3 MG/DL (ref 0–1)
BUN BLDV-MCNC: 21 MG/DL (ref 7–20)
CALCIUM SERPL-MCNC: 8.4 MG/DL (ref 8.3–10.6)
CHLORIDE BLD-SCNC: 103 MMOL/L (ref 99–110)
CO2: 18 MMOL/L (ref 21–32)
CREAT SERPL-MCNC: 1.2 MG/DL (ref 0.6–1.2)
EOSINOPHILS ABSOLUTE: 0 K/UL (ref 0–0.6)
EOSINOPHILS RELATIVE PERCENT: 0.1 %
GFR AFRICAN AMERICAN: 50
GFR NON-AFRICAN AMERICAN: 42
GLOBULIN: 3.4 G/DL
GLUCOSE BLD-MCNC: 150 MG/DL (ref 70–99)
GLUCOSE BLD-MCNC: 176 MG/DL (ref 70–99)
HCT VFR BLD CALC: 28.3 % (ref 36–48)
HEMATOLOGY PATH CONSULT: NO
HEMOGLOBIN: 9.4 G/DL (ref 12–16)
LYMPHOCYTES ABSOLUTE: 1 K/UL (ref 1–5.1)
LYMPHOCYTES RELATIVE PERCENT: 20.1 %
MCH RBC QN AUTO: 42 PG (ref 26–34)
MCHC RBC AUTO-ENTMCNC: 33.3 G/DL (ref 31–36)
MCV RBC AUTO: 126.2 FL (ref 80–100)
MONOCYTES ABSOLUTE: 0.2 K/UL (ref 0–1.3)
MONOCYTES RELATIVE PERCENT: 4.6 %
NEUTROPHILS ABSOLUTE: 3.8 K/UL (ref 1.7–7.7)
NEUTROPHILS RELATIVE PERCENT: 74.9 %
OCCULT BLOOD SCREENING: NORMAL
PDW BLD-RTO: 14.3 % (ref 12.4–15.4)
PERFORMED ON: ABNORMAL
PLATELET # BLD: 249 K/UL (ref 135–450)
PMV BLD AUTO: 7.1 FL (ref 5–10.5)
POTASSIUM REFLEX MAGNESIUM: 5.6 MMOL/L (ref 3.5–5.1)
RBC # BLD: 2.24 M/UL (ref 4–5.2)
SODIUM BLD-SCNC: 130 MMOL/L (ref 136–145)
SPECIMEN STATUS: NORMAL
TOTAL PROTEIN: 6.8 G/DL (ref 6.4–8.2)
TROPONIN: <0.01 NG/ML
WBC # BLD: 5.1 K/UL (ref 4–11)

## 2021-01-27 PROCEDURE — 71045 X-RAY EXAM CHEST 1 VIEW: CPT

## 2021-01-27 PROCEDURE — 80053 COMPREHEN METABOLIC PANEL: CPT

## 2021-01-27 PROCEDURE — 72125 CT NECK SPINE W/O DYE: CPT

## 2021-01-27 PROCEDURE — 81003 URINALYSIS AUTO W/O SCOPE: CPT

## 2021-01-27 PROCEDURE — 85025 COMPLETE CBC W/AUTO DIFF WBC: CPT

## 2021-01-27 PROCEDURE — 2580000003 HC RX 258: Performed by: EMERGENCY MEDICINE

## 2021-01-27 PROCEDURE — 93005 ELECTROCARDIOGRAM TRACING: CPT | Performed by: EMERGENCY MEDICINE

## 2021-01-27 PROCEDURE — 87505 NFCT AGENT DETECTION GI: CPT

## 2021-01-27 PROCEDURE — 70450 CT HEAD/BRAIN W/O DYE: CPT

## 2021-01-27 PROCEDURE — 84132 ASSAY OF SERUM POTASSIUM: CPT

## 2021-01-27 PROCEDURE — 99285 EMERGENCY DEPT VISIT HI MDM: CPT

## 2021-01-27 PROCEDURE — 84484 ASSAY OF TROPONIN QUANT: CPT

## 2021-01-27 PROCEDURE — G0328 FECAL BLOOD SCRN IMMUNOASSAY: HCPCS

## 2021-01-27 RX ORDER — 0.9 % SODIUM CHLORIDE 0.9 %
500 INTRAVENOUS SOLUTION INTRAVENOUS ONCE
Status: COMPLETED | OUTPATIENT
Start: 2021-01-27 | End: 2021-01-28

## 2021-01-27 RX ADMIN — SODIUM CHLORIDE 500 ML: 9 INJECTION, SOLUTION INTRAVENOUS at 23:19

## 2021-01-28 VITALS
RESPIRATION RATE: 17 BRPM | DIASTOLIC BLOOD PRESSURE: 84 MMHG | HEART RATE: 81 BPM | OXYGEN SATURATION: 96 % | BODY MASS INDEX: 18.97 KG/M2 | SYSTOLIC BLOOD PRESSURE: 144 MMHG | TEMPERATURE: 98.1 F | WEIGHT: 114 LBS

## 2021-01-28 LAB
BILIRUBIN URINE: NEGATIVE
BLOOD, URINE: NEGATIVE
CLARITY: CLEAR
COLOR: YELLOW
EKG ATRIAL RATE: 54 BPM
EKG DIAGNOSIS: NORMAL
EKG P AXIS: 37 DEGREES
EKG P-R INTERVAL: 214 MS
EKG Q-T INTERVAL: 506 MS
EKG QRS DURATION: 104 MS
EKG QTC CALCULATION (BAZETT): 479 MS
EKG R AXIS: -17 DEGREES
EKG T AXIS: 50 DEGREES
EKG VENTRICULAR RATE: 54 BPM
GLUCOSE URINE: NEGATIVE MG/DL
KETONES, URINE: NEGATIVE MG/DL
LEUKOCYTE ESTERASE, URINE: NEGATIVE
MICROSCOPIC EXAMINATION: NORMAL
NITRITE, URINE: NEGATIVE
PH UA: 6 (ref 5–8)
POTASSIUM SERPL-SCNC: 3.5 MMOL/L (ref 3.5–5.1)
PROTEIN UA: NEGATIVE MG/DL
SARS-COV-2, NAAT: DETECTED
SPECIFIC GRAVITY UA: 1.02 (ref 1–1.03)
URINE TYPE: NORMAL
UROBILINOGEN, URINE: 0.2 E.U./DL

## 2021-01-28 PROCEDURE — 97116 GAIT TRAINING THERAPY: CPT

## 2021-01-28 PROCEDURE — 97166 OT EVAL MOD COMPLEX 45 MIN: CPT

## 2021-01-28 PROCEDURE — 97530 THERAPEUTIC ACTIVITIES: CPT

## 2021-01-28 PROCEDURE — 97162 PT EVAL MOD COMPLEX 30 MIN: CPT

## 2021-01-28 PROCEDURE — 93010 ELECTROCARDIOGRAM REPORT: CPT | Performed by: INTERNAL MEDICINE

## 2021-01-28 PROCEDURE — 97535 SELF CARE MNGMENT TRAINING: CPT

## 2021-01-28 PROCEDURE — 2580000003 HC RX 258: Performed by: EMERGENCY MEDICINE

## 2021-01-28 PROCEDURE — U0002 COVID-19 LAB TEST NON-CDC: HCPCS

## 2021-01-28 RX ORDER — SODIUM CHLORIDE 9 MG/ML
1000 INJECTION, SOLUTION INTRAVENOUS CONTINUOUS
Status: DISCONTINUED | OUTPATIENT
Start: 2021-01-28 | End: 2021-01-28 | Stop reason: HOSPADM

## 2021-01-28 RX ADMIN — SODIUM CHLORIDE 1000 ML: 9 INJECTION, SOLUTION INTRAVENOUS at 00:27

## 2021-01-28 NOTE — ED PROVIDER NOTES
I received this patient in signout from Dr. Leann Patino at about 6 in the morning. Per Dr. Leann Patino, she is now medically clear for PT OT evaluation and case management for placement. I evaluated the patient and she does not have any complaints at this time. Wishes to have help to turn on the television. She denies any pain at this time. When I reminded her of her fall, she states that she does not think that she has any injury from it. Patient has unremarkable vital signs except for hypertension. PT OT evaluation recommended skilled nursing facility. This was arranged with [facility]. Daughter was updated.       Hannah Gallo MD  01/28/21 7123

## 2021-01-28 NOTE — PROGRESS NOTES
Restrictions  Restrictions/Precautions  Restrictions/Precautions: General Precautions, Fall Risk, Contact Precautions  Position Activity Restriction  Other position/activity restrictions: Droplet plus precautions; COVID +    Subjective   General  Chart Reviewed: Yes, Orders, Progress Notes  Patient assessed for rehabilitation services?: Yes  Family / Caregiver Present: No  Referring Practitioner: Dayami Elizabeth MD 1/27/2121  Diagnosis: frequent falls  Subjective  Subjective: Gabriela Rothman is around my neck? Pt has life alert necklace and mask around her neck. Patient Currently in Pain: No  Vital Signs  Pulse: 82  BP: (!) 149/57  Patient Currently in Pain: No  Oxygen Therapy  SpO2: 95 %  O2 Device: None (Room air)  Social/Functional History  Social/Functional History  Lives With: Alone  Type of Home: Apartment([facility])  Home Layout: One level  Home Access: Level entry  Bathroom Shower/Tub: Shower chair with back, Walk-in shower  Bathroom Toilet: Standard  Bathroom Equipment: Grab bars in shower, Grab bars around toilet  Home Equipment: 4 wheeled walker, Alert Button, [employer], Lift chair  Receives Help From: Home health, Personal care attendant(home therapy 2 x/wk; per pt PCA every other day for showers.  Per prior PT eval in 1/05/2021, has aid daily for dressing and bathing)  ADL Assistance: Needs assistance  Homemaking Responsibilities: No  Ambulation Assistance: Needs assistance(with 3IA)  Transfer Assistance: Needs assistance  Additional Comments: per chart, pt with mulitple recent falls       Objective   Vision: Impaired  Vision Exceptions: Legally blind  Hearing: Exceptions to [facility]  Hearing Exceptions: Hard of hearing/hearing concerns(pt does have hearing aids but they are not at the hospital)          Balance  Sitting Balance: Supervision  Standing Balance: Contact guard assistance(with RW)  Functional Mobility  Functional - Mobility Device: Rolling Walker  Activity: Other(in room)  Assist Level: Contact guard assistance  Functional Mobility Comments: cues for environment d/t blindness     ADL  UE Dressing: Minimal assistance  LE Dressing: Moderate assistance  Toileting: (purewick)     Tone RUE  RUE Tone: Normotonic  Tone LUE  LUE Tone: Normotonic  Coordination  Movements Are Fluid And Coordinated: Yes     Transfers:  Sit to stand: CGA/Ta up to RW  Stand to sit:CGA    Bed mobility:  ModA in/out of bed. LUE AROM (degrees)  LUE AROM : WFL  RUE AROM (degrees)  RUE AROM : WFL  LUE Strength  Gross LUE Strength: (4-/5 grossly)  RUE Strength  Gross RUE Strength: (4-/5 grossly)         Plan   Plan  Times per week: 3-5x's a week while in acute care    AM-PAC Score        AM-[facility] Inpatient Daily Activity Raw Score: 15 (01/28/21 0936)  AM-PAC Inpatient ADL T-Scale Score : 34.69 (01/28/21 0936)  ADL Inpatient CMS 0-100% Score: 56.46 (01/28/21 0936)  ADL Inpatient CMS G-Code Modifier : CK (01/28/21 0936)    Goals  Short term goals  Time Frame for Short term goals: 1 week unless otherwise specified 2/04  Short term goal 1: Pt will complete LE dressing with CGA  Short term goal 2: Pt will complete toilet transfers with SBA by 2/2  Short term goal 3: Pt will complete standing level ADLs with SBA  Patient Goals   Patient goals : \"to go home\"       Therapy Time   Individual Concurrent Group Co-treatment   Time In 0840         Time Out 0913         Minutes 33         Timed Code Treatment Minutes: 23 Minutes       Jeanell Fleischer, OTR/L  If pt is unable to be seen after this session, please let this note serve as discharge summary. Please see case management note for discharge disposition. Thank you.

## 2021-01-28 NOTE — ED PROVIDER NOTES
Emergency Physician Note  [address]  ED  [address] [address]  Dept: [phone]  Dept Fax: [phone]  Loc: [phone]  Open Note Time:  10:28 PM EST    Chief Complaint  Loss of Consciousness (squad called for lift assist.  Pt down in BR.  during lift pt became unresponsive approx 5 minutes. Pt incont of stool. Pt slow to anwer questions but alert ) and Diarrhea (large loose and formed BM at time of arrival. )       History of Present Illness  Ann Marie An is a 80 y.o. female  has a past medical history of Atrial fibrillation ([address] Colon polyps, Depression, Diabetes mellitus ([address] Diverticula of colon, Elbow injury, Esophageal reflux, Glaucoma, Hearing loss, Hyperlipidemia, Insomnia, Osteoporosis, PAT (paroxysmal atrial tachycardia) ([address] Rhinitis, allergic, and Wears glasses. who presents to the ED for appendectomy. It was reported that it looks like the patient was getting out of bed and walking to the bathroom when she fell. EMS was initially called for lift assist however when they got her up it appeared that she is had a syncopal episode that lasted for approximately 5 minutes. She also had a large bowel movement. Patient at this time does not have any complaints other than feeling very thirsty. She cannot recall if she hit her head when she fell. Patient states that she does not eat very much food as she is not very hungry however she says she is drinking fluids. Denies fever,   chest pain, shortness of breath, cough, abdominal pain, nausea, vomiting, diarrhea, headache, back pain, rash. No palliative/provocative factors.        Unless otherwise stated in this report or unable to obtain because of the patient's clinical or mental status as evidenced by the medical record, this patient's positive and negative responses for review of systems, constitutional, psych, eyes, ENT, cardiovascular, respiratory, gastrointestinal, neurological, genitourinary, musculoskeletal, integument systems and systems related to the presenting problem are either stated in the preceding paragraph or were not pertinent or were negative for the symptoms and/or complaints related to the medical problem. I have reviewed the following from the nursing documentation:      Prior to Admission medications    Medication Sig Start Date End Date Taking? Authorizing Provider   propafenone (RYTHMOL SR) 225 MG extended release capsule Take 1 capsule by mouth every 12 hours 1/8/21   Patrice Hill MD   gabapentin (NEURONTIN) 100 MG capsule Take 1 capsule by mouth 2 times daily for 5 days. 1/8/21 1/13/21  Patrice Hill MD   meclizine (ANTIVERT) 12.5 MG tablet Take 1 tablet by mouth 3 times daily as needed for Dizziness 1/6/21   Patrice Hill MD   dorzolamide (TRUSOPT) 2 % ophthalmic solution Apply to eye    Historical Provider, MD   acetaminophen (TYLENOL) 500 MG tablet Take 500 mg by mouth    Historical Provider, MD   hydroxyurea (HYDREA) 500 MG chemo capsule  5/17/19   Historical Provider, MD   Multiple Vitamin (MULTIVITAMIN) tablet Take 1 tablet by mouth    Historical Provider, MD   simvastatin (ZOCOR) 20 MG tablet Take 1 tablet by mouth nightly 4/13/18   Tyler Hong MD   saline nasal gel (AYR) GEL by Nasal route as needed for Congestion    Historical Provider, MD   timolol (TIMOPTIC-XE) 0.5 % ophthalmic gel-forming Place 1 drop into both eyes daily    Historical Provider, MD   aspirin 81 MG EC tablet Take 81 mg by mouth daily.     Historical Provider, MD       Allergies as of 01/27/2021 - Review Complete 01/27/2021   Allergen Reaction Noted    Actonel [risedronate sodium] Other (See Comments) 01/11/2011    Amoxicillin Hives 01/11/2011       Past Medical History:   Diagnosis Date    Atrial fibrillation ([facility]     not on AC due to frequent falls    Colon polyps 05/01/2005    Depression     Diabetes mellitus ([facility]     Diverticula of colon 2005    Elbow injury 2010    fell -injured lt elbow    Esophageal reflux     Glaucoma     Hearing loss     hard of hearing - bilateral hearing aides    Hyperlipidemia     Insomnia     Osteoporosis     PAT (paroxysmal atrial tachycardia) (HCC)     Rhinitis, allergic     Wears glasses         Surgical History:   Past Surgical History:   Procedure Laterality Date    APPENDECTOMY      CATARACT REMOVAL WITH IMPLANT  11    right eye    CATARACT REMOVAL WITH IMPLANT  t    cataract removal with lens implant left eye    CHOLECYSTECTOMY      COLONOSCOPY      HYSTERECTOMY      TONSILLECTOMY      age 15        Family History:    Family History   Problem Relation Age of Onset    Stroke Father     Diabetes Brother     Diabetes Maternal Aunt        Social History     Socioeconomic History    Marital status:       Spouse name: Not on file    Number of children: Not on file    Years of education: Not on file    Highest education level: Not on file   Occupational History    Not on file   Social Needs    Financial resource strain: Not on file    Food insecurity     Worry: Not on file     Inability: Not on file    Transportation needs     Medical: Not on file     Non-medical: Not on file   Tobacco Use    Smoking status: Former Smoker     Quit date: 1975     Years since quittin.0    Smokeless tobacco: Never Used   Substance and Sexual Activity    Alcohol use: Yes     Comment: occasionally    Drug use: No    Sexual activity: Not on file   Lifestyle    Physical activity     Days per week: Not on file     Minutes per session: Not on file    Stress: Not on file   Relationships    Social connections     Talks on phone: Not on file     Gets together: Not on file     Attends Rastafari service: Not on file     Active member of club or organization: Not on file     Attends meetings of clubs or organizations: Not on file     Relationship status: Not on file    Intimate partner violence     Fear of current or ex partner: Not on file     Emotionally abused: Not on file     Physically abused: Not on file     Forced sexual activity: Not on file   Other Topics Concern    Not on file   Social History Narrative    Not on file       Nursing notes reviewed. ED Triage Vitals   Enc Vitals Group      BP 01/27/21 2222 (!) 105/56      Pulse 01/27/21 2222 59      Resp 01/27/21 2209 14      Temp --       Temp src --       SpO2 01/27/21 2209 95 %      Weight 01/27/21 2209 114 lb (51.7 kg)      Height --       Head Circumference --       Peak Flow --       Pain Score --       Pain Loc --       Pain Edu? --       Excl. in GC? --        GENERAL:   Body mass index is 18.97 kg/m². Awake, alert. Well developed, cachectic with no apparent distress. Nontoxic-appearing, non-ill-appearing. HENT:   Normocephalic, Atraumatic, no lacerations. No ENT exam due to PPE. EYES:   Conjunctiva normal,   Pupils equal round and reactive to light,   Extraocular movements normal.  NECK:  Trachea is midline. No stridor. Kyphosis present  CHEST:  Regular rate and regular rhythm, no murmurs/rubs/gallops,   normal S1/S2, chest wall non-tender. LUNGS:  No respiratory distress. No abdominal retractions, no sternal retractions. Clear to auscultation bilaterally, no wheezing, no rhochi, no rales  Speaking comfortably in full sentences  ABDOMEN:  Soft, non-tender, non-distended. No guarding. No rebound. No costovertebral angle tenderness to palpation. Normal BS, no organomegaly, no abdominal masses  Continent of brown, well-formed stool  Inspection of the external rectal region does not reveal any fissures or hemorrhoids  EXTREMITIES:  Moves extremities x4 with purpose. Normal range of motion, no edema,   No tenderness, no deformity,   distal pulses present and equal bilaterally. BACK:  No midline tenderness in the cervical, thoracic, and lumbar spine. No deformities, no step-off.    Palpation did not elicit any paraspinous tenderness. SKIN:  Warm, dry and intact. NEUROLOGIC:  Normal mental status. Moving all extremities to command. Alert and oriented x4   without focal motor deficit or gross sensory deficit. Normal speech. PSYCHIATRIC:  Not anxious,   normal mood and affect,   thoughts are linear and organized,   without delusions/hallucinations,   Not responding to internal stimuli,  responds appropriately to questions    LABS and DIAGNOSTIC RESULTS  EKG  The Ekg interpreted by me shows  sinus bradycardia, rate=54 with a rate of 54  Axis is   Normal  QTc is  within an acceptable range  Intervals and Durations are unremarkable. ST Segments: no acute change and normal  Delta waves, Brugada Syndrome, and Short GA are not present. Prior EKG to compare with was available. No significant changes compared to prior EKG from January 7, 2021, of note patient has sinus bradycardia on previous EKG    Cardiac Monitor Strip Interpretation  Interpreted by me  Monitor strip interpreted for greater than 10 seconds  Rhythm: sinus bradycardia  Rate: 50-60  Ectopy: none  ST Segments: normal    RADIOLOGY  X-RAYS:  I have reviewed radiologic plain film image(s). ALL OTHER NON-PLAIN FILM IMAGES SUCH AS CT, ULTRASOUND AND MRI HAVE BEEN READ BY THE RADIOLOGIST. XR CHEST PORTABLE   Final Result   Mild chronic obstructive lung changes with a questionable early infiltrate or   atelectasis along the left lung base. CT Head WO Contrast   Final Result   No acute intracranial abnormality. CT CERVICAL SPINE WO CONTRAST   Final Result   1. No evidence of an acute fracture or traumatic malalignment involving the   cervical spine   2. 12 mm left thyroid nodule.       RECOMMENDATIONS:   Elective thyroid ultrasound recommended for further evaluation              LABS  Results for orders placed or performed during the hospital encounter of 01/27/21   CBC Auto Differential   Result Value Ref Range WBC 5.1 4.0 - 11.0 K/uL    RBC 2.24 (L) 4.00 - 5.20 M/uL    Hemoglobin 9.4 (L) 12.0 - 16.0 g/dL    Hematocrit 28.3 (L) 36.0 - 48.0 %    .2 (H) 80.0 - 100.0 fL    MCH 42.0 (H) 26.0 - 34.0 pg    MCHC 33.3 31.0 - 36.0 g/dL    RDW 14.3 12.4 - 15.4 %    Platelets 804 559 - 553 K/uL    MPV 7.1 5.0 - 10.5 fL    Path Consult No     Neutrophils % 74.9 %    Lymphocytes % 20.1 %    Monocytes % 4.6 %    Eosinophils % 0.1 %    Basophils % 0.3 %    Neutrophils Absolute 3.8 1.7 - 7.7 K/uL    Lymphocytes Absolute 1.0 1.0 - 5.1 K/uL    Monocytes Absolute 0.2 0.0 - 1.3 K/uL    Eosinophils Absolute 0.0 0.0 - 0.6 K/uL    Basophils Absolute 0.0 0.0 - 0.2 K/uL   Comprehensive Metabolic Panel w/ Reflex to MG   Result Value Ref Range    Sodium 130 (L) 136 - 145 mmol/L    Potassium reflex Magnesium 5.6 (H) 3.5 - 5.1 mmol/L    Chloride 103 99 - 110 mmol/L    CO2 18 (L) 21 - 32 mmol/L    Anion Gap 9 3 - 16    Glucose 176 (H) 70 - 99 mg/dL    BUN 21 (H) 7 - 20 mg/dL    CREATININE 1.2 0.6 - 1.2 mg/dL    GFR Non-African American 42 (A) >60    GFR  50 (A) >60    Calcium 8.4 8.3 - 10.6 mg/dL    Total Protein 6.8 6.4 - 8.2 g/dL    Albumin 3.4 3.4 - 5.0 g/dL    Albumin/Globulin Ratio 1.0 (L) 1.1 - 2.2    Total Bilirubin 0.3 0.0 - 1.0 mg/dL    Alkaline Phosphatase 77 40 - 129 U/L    ALT 10 10 - 40 U/L    AST <5 (A) 15 - 37 U/L    Globulin 3.4 g/dL   Urinalysis, reflex to microscopic   Result Value Ref Range    Color, UA Yellow Straw/Yellow    Clarity, UA Clear Clear    Glucose, Ur Negative Negative mg/dL    Bilirubin Urine Negative Negative    Ketones, Urine Negative Negative mg/dL    Specific Gravity, UA 1.020 1.005 - 1.030    Blood, Urine Negative Negative    pH, UA 6.0 5.0 - 8.0    Protein, UA Negative Negative mg/dL    Urobilinogen, Urine 0.2 <2.0 E.U./dL    Nitrite, Urine Negative Negative    Leukocyte Esterase, Urine Negative Negative    Microscopic Examination Not Indicated     Urine Type NotGiven    Blood Occult Stool Screen #1   Result Value Ref Range    Occult Blood Screening Result: Negative  Normal range: Negative      Troponin   Result Value Ref Range    Troponin <0.01 <0.01 ng/mL   Sample possible blood bank testing   Result Value Ref Range    Specimen Status IRIS    Potassium   Result Value Ref Range    Potassium 3.5 3.5 - 5.1 mmol/L   POCT Glucose   Result Value Ref Range    POC Glucose 150 (H) 70 - 99 mg/dl    Performed on ACCU-CHEK    EKG 12 Lead   Result Value Ref Range    Ventricular Rate 54 BPM    Atrial Rate 54 BPM    P-R Interval 214 ms    QRS Duration 104 ms    Q-T Interval 506 ms    QTc Calculation (Bazett) 479 ms    P Axis 37 degrees    R Axis -17 degrees    T Axis 50 degrees    Diagnosis       Sinus bradycardia with 1st degree A-V blockOtherwise normal ECGWhen compared with ECG of 07-JAN-2021 11:35,Nonspecific T wave abnormality no longer evident in Inferior leadsNonspecific T wave abnormality no longer evident in Anterolateral leads       SCREENINGS  NIH Score     [facility]     [facility]    Heart Score              PROCEDURES    MEDICAL DECISION MAKING    Procedures/interventions/images ordered for this visit  Orders Placed This Encounter   Procedures    GI Bacterial Pathogens By PCR    CT Head WO Contrast    CT CERVICAL SPINE WO CONTRAST    CBC Auto Differential    Comprehensive Metabolic Panel w/ Reflex to MG    Urinalysis, reflex to microscopic    Blood Occult Stool Screen #1    Troponin    Sample possible blood bank testing    If diarrhea: have patient collect stool sample    POCT Glucose    EKG 12 Lead    Saline lock IV       Medications ordered for this visit  No orders of the defined types were placed in this encounter.       ED course notes for this visit     REVIEW OF PREVIOUS RECORDS  I have reviewed the old records of Baljinder Carter which reveal the following pertinent information:    Patient was admitted January 4 through January 8 for syncopal episode, summarization her of her previous admission:  On January 4 presented to [facility] and inability to get up, apparently she had a fall the weekend prior to admission and then again fell the next day. Patient refused to first EMS visit to bring her to the ER. Sejal Reaves Pt lives in assisted living at [facility]  Syncope- unclear etiology, ekg noted sinus cesilia, also with + orthostatic hypotension, Ct head/neck were unremarkable  -checked echo and unremarkable  -tele  -ivfs given  -Check am orthostatics was +(down to 33T systolic on 1/6)   -prn meclizine ordered  -cardiac monitor on dc   -cards consulted(for possible autonomic dysfcn), stopped bb, stopped norvasc, reduced rythmol dose   -started dejan hose  -consider florinef as outpt  -arranged cardiac monitor on dc    I wore N95 Envo mask with filter protection, facial shield and gloves when I evaluated the patient. I evaluated the patient in room 02/02    Spoke with her daughter Kenn Castro, she confirmed that the patient is DNR/DNI. She states that her mother had been discharged to subacute rehab facility at the [facility] however she was discharged back to her apartment at [facility] a week ago. Today was the first time she is fallen since leaving the subacute rehab facility. Daughter is trying to find a higher level of care for the patient however finances are an issue, she states if she ends up getting to discharge to different facility her preference would be [facility] due to location. Since the patient is already had admission for a syncopal work-up I did not feel that barring any acute findings that she would be appropriate for admission. I did did however tell the daughter that I be willing to keep the patient under observation here in the ER and we can have case management visit her in the morning to see if there is a more appropriate place for the patient to be discharged to.   The daughter does have concerns that the patient may not want to leave her apartment because she would lose that if she got placed somewhere else and that this would be a question we did asked the patient in the morning when case management is involved. Covid Decompensation Risk Scale    Screen for Imminent Risk:  O2 Sat <95%, SBP <100 or DBP <60? no (0)   RR >22? no (0)   Age 61+ AND Pulse >100 at time of disposition?  no (0)   YES TO ANY:  HIGH RISK ---> ADMIT             Clinical Risk Score:      Age <50 = -1 pts 0   Age 52-63 = 2 pts 0   Age 61-76 = 3 pts 0   Age 74+ = 4 pts 4   Long Term Care facility = 4 pts 4   Tachycardia = 4 pts  0   Non-exertional Dyspnea or change in   exercise tolerance = 6 pts 0   Symptom onset (Days): <5 or >10  = 0 pts 0   Symptom onset (Days): 5-10  = 4 pts 0   Male = 1 pt 0   CAD = 1 pt 0   DM = 1 pt 1   Cancer = 1 pt 0   Immunosuppression = 1 pt 0   CVA/TIA: = 1 pt 0   COPD (excludes asthma): = 1 pt 0   Chronic renal dz = 1 pt 0   Chronic liver dz = 1 pt 0   Total:    6            Score ? 8:   Workup: CXR  CXR Normal = Low Risk, Supports Discharge  If CXR shows infiltrate go to Intermediate Risk    Score 9-12: Intermediate Risk  Workup: CBC, CMP, Troponin, D-Dimer, Lactate, CXR  If any below present, strongly consider admission (screen for sepsis if concurrent bacterial pneumonia, consider initiating VTE prophylaxis)  CXR with infiltrate (Lobar infiltrate, greater than or equal to 2 lung zones with opacity, diffuse patchy, or greater than mild bibasilar or mild diffuse)   ?troponin  ?lactate   D-dimer > 0.75 ug/mL or 1.5x upper limit (do not age adjust). Score >12:  High Risk? Strongly consider admission  (screen for sepsis if concurrent bacterial pneumonia, consider initiating VTE prophylaxis). Management decisions relating to this patient encounter were made during the [id number] public health emergency. As a result, admission to the hospital and further ED observation/treatment pose increased risk due to multiple factors.  At this point in time, the risk of hospital admission or further ED observation/treatment of this patient is deemed to be greater than the risk of discharge. I engaged in a shared decision-making conversation with the patient. The patient agrees and wishes to go home. Based on history, physical exam, and testing, this patient is low risk for acute decompensation in the setting of confirmed or suspected COVID-19. Low risk can be solidly predicted when the patient has one of the following scenarios:    Younger age, no risk factors and a reassuring clinical picture  Younger age, risk factors, but negative testing (which in some cases doesn't need to be anything other than a chest X-ray) and a reassuring clinical picture. Older age, with or without risk factors, negative testing (which in some cases doesn't need to be anything other than a chest X-ray), and a reassuring clinical picture. The risk estimate does not mean that the risk is zero. It means that the risk of decompensation is lower than the risk of being in the hospital at this time. This risk estimate, based on the most current data on COVID-19, is concordant with my clinical judgement that the patient has a reassuring clinical appearance. I have signed out [facility] Emergency Department care to my colleague, Dr. Liliam Lawton. We discussed the pertinent history, physical exam, completed/pending test results (if applicable) and current treatment plan. Please refer to his/her chart for the patients remaining Emergency Department course and final disposition. Final Impression    1. Syncope, unspecified syncope type    2. Incontinence of feces, unspecified fecal incontinence type    3. Fall, initial encounter    4. COVID-19 virus infection        Blood pressure (!) 127/52, pulse 57, temperature 97.4 °F (36.3 °C), temperature source Oral, resp. rate 14, weight 114 lb (51.7 kg), SpO2 96 %.       Pt is in stable condition upon Other Disposition: ED observation. The note was completed using Dragon voice recognition transcription. Every effort was made to ensure accuracy; however, inadvertent transcription errors may be present despite my best efforts to edit errors.     Lynda Barrow MD  5 [facility], MD  01/28/21 COLUMBA Renee 53 Eleazar Olivarez MD  01/28/21 Dede Delacruz 372 Eleazar Olivarez MD  01/28/21 8210

## 2021-01-28 NOTE — ED NOTES
Pt unable to urinate. Bladder scan volume of 548mL. Notified Dr. Navi Valle. Order for straight cath received.      Kavon Jin RN  01/28/21 1461

## 2021-01-28 NOTE — CARE COORDINATION
Consult: Ibrahima Berry [phone], daughter has concerns that the patient may not want to give up her current apartment  Writer met with patient in room, patient not clear in thinking having trouble clearly explaining daily care ie  diff care that was receiving how often etc. Writer placed call to dtr get a better idea of the home situation. Per dtr living in assisted living having several times a day checks but feels she left rehab last time too soon. Dtr edu on therapy rec for placement. Dtr is agreeable with goal to complete rehab. Dtr in hopes to return home vs transitioning to LTC pending clinical improvement. Dtr is aware of needing to pay [facility] for LTC if that is hat is needed and is willing to spending down funds for BRIAN. Referrals pending at [address] [place] [address] and [facility] for review per dtr okay. ED Shabazz  1128: Writer notified that Patient has been accepted at both [facility] and the [facility] re location transport scheduled. CASE MANAGEMENT DISCHARGE SUMMARY    Discharge to: SNF    Precertification completed: NA Medicare waived   Hospital Exemption Notification (HENS) completed: Yes     New Durable Medical Equipment ordered/agency: Differ to SNF    Transportation:    Medical Transport explained to pt/family. Pt/family voice no agency preference. Agency used: [facility]    time:1430   Ambulance form completed: Yes    Confirmed discharge plan with: Dtr re confusion Grady Worley [phone]   Facility/Agency, name:  [facility]    Phone number for report to facility: [phone]     [facility]     Note: Discharging nurse to complete KIKE, reconcile AVS, and place final copy with patient's discharge packet. RN to ensure that written prescriptions for  Level II medications are sent with patient to the facility as per protocol.   ED Shabazz

## 2021-01-28 NOTE — ED NOTES
Completed straight catheterization on pt using sterile technique. Clean urine sample was obtained and sent to the lab with proper labels. Pt tolerated well.       Meti Lemu  01/27/21 4432

## 2021-01-28 NOTE — ED NOTES
Report called to [facility] with [facility] pass. Report given to transporting squad. Patient loaded up, warm blankets applied. All personal belongings sent with patient to [facility]. NOTE: Patient did not come to the ED with any hearing aids. She told writer she left them at home.       Ewelina Germain RN  01/28/21 0492

## 2021-01-28 NOTE — ED NOTES
Pt arrived covered in copious amounts of loose stool. Pt cleaned with warm wipes, tu care completed. Linens changed at this time.        Cristina Rivera RN  01/27/21 6346

## 2021-01-28 NOTE — ED NOTES
Patient identified as a positive fall risk on the ED triage fall screening. Patient placed in fall precautions which includes:  yellow fall risk bracelet on wrist,patient wearing shoes, \"Be Safe\" sign placed on patient's door, and bed alarm placed under patient/alarm turned on. Patient instructed on importance of not getting out of bed or ambulating without assistance for safety.              Sharron Calabrese RN  01/27/21 6380

## 2021-01-28 NOTE — ED NOTES
Straight cathed patient using sterile techniques. Due to retaining urine. Patients Bladder scan showed about 548 mL of urine. Was able to get about 500 mL out with straight cath. Patient tolerated well. Pure wick placed on patient with depends over top and absorbent pads underneath. ED KENDRICK Sterling aware.      Nati Juarez  01/28/21 0544

## 2021-01-28 NOTE — PROGRESS NOTES
Physical Therapy    Facility/Department: [address] [place] [address]  ED  Initial Assessment    NAME: Sha Phipps  : 1924  MRN: [id number]    Date of Service: 2021    Discharge Recommendations:  Subacute/Skilled Nursing Facility   PT Equipment Recommendations  Equipment Needed: No    Assessment   Body structures, Functions, Activity limitations: Decreased functional mobility ; Decreased balance;Decreased strength;Decreased safe awareness;Decreased cognition;Decreased endurance  Assessment: Pt is 79 yo female who presents with diagnosis of frequent falls. Pt lives at indep living facility and does not appear to have 24-hr assist. Per chart, pt with frequent falls. Uses 4WW at baseline and needs assist for most mobility. Pt would benefit from continued skilled therapy to address deficits. Recommend SNF at d/c. Treatment Diagnosis: impaired functional mobility  Specific instructions for Next Treatment: progress mobility as tolerated  Prognosis: Good  Decision Making: Medium Complexity  PT Education: Goals; General Safety;Gait Training;PT Role;Disease Specific Education;Plan of Care; Functional Mobility Training;Transfer Training  Patient Education: Pt educated on safe gait training with RW to improve balance -- pt verbalized understanding but would benefit from reinforcement  Barriers to Learning: cognition  REQUIRES PT FOLLOW UP: Yes  Activity Tolerance  Activity Tolerance: Patient Tolerated treatment well;Patient limited by fatigue;Patient limited by endurance       Patient Diagnosis(es): The primary encounter diagnosis was Syncope, unspecified syncope type. Diagnoses of Incontinence of feces, unspecified fecal incontinence type, Fall, initial encounter, and COVID-19 virus infection were also pertinent to this visit.      has a past medical history of Atrial fibrillation ([address] Colon polyps, Depression, Diabetes mellitus ([address] Diverticula of colon, Elbow injury, Esophageal reflux, Glaucoma, Hearing loss, Hyperlipidemia, Insomnia, Osteoporosis, PAT (paroxysmal atrial tachycardia) ([facility] Rhinitis, allergic, and Wears glasses. has a past surgical history that includes Cholecystectomy; Colonoscopy; Hysterectomy; Tonsillectomy; Appendectomy; Cataract removal with implant (1/17/11); and Cataract removal with implant (t). Restrictions  Restrictions/Precautions  Restrictions/Precautions: General Precautions, Fall Risk, Contact Precautions  Position Activity Restriction  Other position/activity restrictions: Droplet plus precautions; COVID +  Vision/Hearing  Vision: Impaired  Vision Exceptions: (significant visual deficits)     Subjective  General  Chart Reviewed: Yes  Patient assessed for rehabilitation services?: Yes  Response To Previous Treatment: Not applicable  Family / Caregiver Present: No  Referring Practitioner: Dr. Danisha Cantu MD  Referral Date : 01/28/21  Diagnosis: Frequent Falls  Follows Commands: Within Functional Limits  General Comment  Comments: Pt resting in bed on approach; RN cleared pt for all mobility. Subjective  Subjective: pt agreeable to therapy  Pain Screening  Patient Currently in Pain: Denies  Vital Signs  Pulse: 82  BP: (!) 149/57  Patient Currently in Pain: Denies  Oxygen Therapy  SpO2: 95 %  O2 Device: None (Room air)       Social/Functional History  Social/Functional History  Lives With: Alone  Type of Home: Apartment([facility])  Home Layout: One level  Home Access: Level entry  Bathroom Shower/Tub: Shower chair with back, Walk-in shower  Bathroom Toilet: Standard  Bathroom Equipment: Grab bars in shower, Grab bars around toilet  Home Equipment: 4 wheeled walker, Alert Button, [employer], Lift chair  Receives Help From: Home health, Personal care attendant(home therapy 2 x/wk; per pt PCA every other day for showers.  Per prior PT eval in 1/05/2021, has aid daily for dressing and bathing)  ADL Assistance: Needs assistance  Homemaking Responsibilities: No  Ambulation Assistance: Needs assistance(with 0AD)  Transfer Assistance: Needs assistance  Additional Comments: per chart, pt with mulitple recent falls    Objective     AROM RLE (degrees)  RLE AROM: WFL  AROM LLE (degrees)  LLE AROM : WFL  Strength RLE  Comment: Grossly 4/5 throughout  Strength LLE  Comment: Grossly 4/5 throughout        Bed mobility  Supine to Sit: Minimal assistance; Moderate assistance(for trunk)  Sit to Supine: Minimal assistance     Transfers  Sit to Stand: Contact guard assistance  Stand to sit: Contact guard assistance     Ambulation  Ambulation?: Yes  Ambulation 1  Surface: level tile  Device: Rolling Walker  Assistance: Contact guard assistance  Quality of Gait: Cues for sequencing and path negotiation d/t poor vision. No overt LOB  Gait Deviations: Decreased step length;Decreased step height;Slow Avani; Shuffles  Distance: 40 ft  Comments: Distance limited d/t droplet precautions     Balance  Sitting - Static: Good  Sitting - Dynamic: Good;-  Standing - Static: Fair;+  Standing - Dynamic: [address]  Times per week: 3-5x/wk  Times per day: Daily  Specific instructions for Next Treatment: progress mobility as tolerated  Current Treatment Recommendations: Strengthening, Neuromuscular Re-education, Safety Education & Training, Balance Training, Endurance Training, Functional Mobility Training, Transfer Training, Gait Training, Equipment Evaluation, Education, & procurement, Patient/Caregiver Education & Training  Safety Devices  Type of devices:  All fall risk precautions in place, Call light within reach, Gait belt, Patient at risk for falls, Nurse notified, Bed alarm in place, Left in bed                        AM-PAC Score     AM-PAC Inpatient Mobility without Stair Climbing Raw Score : 15 (01/28/21 0921)  AM-PAC Inpatient without Stair Climbing T-Scale Score : 43.03 (01/28/21 0921)  Mobility Inpatient CMS 0-100% Score: 47.43 (01/28/21 6821)  Mobility Inpatient without Stair CMS G-Code Modifier : CK (01/28/21 0794)       Goals  Short term goals  Time Frame for Short term goals: 1 week (2/04) unless otherwise specified  Short term goal 1: Pt will be SBA for bed mobility. Short term goal 2: Pt with be SBA for transfers with RW. Short term goal 3: Pt will ambulate 50 ft with RW and SBA. Short term goal 4: 2/01: Pt will particicpate in 12-15 reps of BLE exercises to promote strength and activity tolerance. Patient Goals   Patient goals : \"to get better\"       Therapy Time   Individual Concurrent Group Co-treatment   Time In 0804         Time Out 1707         Minutes 34         Timed Code Treatment Minutes: 24 Minutes       Cassie Barrios, PT, DPT  If pt is unable to be seen after this session, please let this note serve as discharge summary. Please see case management note for discharge disposition. Thank you.

## 2021-01-28 NOTE — DISCHARGE INSTR - COC
Continuity of Care Form    Patient Name: Rene Bowen   :  1924  MRN:  [id number]    Admit date:  2021  Discharge date:  2021    Code Status Order: DNR-CCA   Advance Directives:     Admitting Physician:  No admitting provider for patient encounter. PCP: Emil Linares MD    Discharging Nurse: [facility] Unit/Room#:   Discharging Unit Phone Number: ***    Emergency Contact:   Extended Emergency Contact Information  Primary Emergency Contact: Cecy Dailey Phone: [phone]  Mobile Phone: [phone]  Relation: Child  Secondary Emergency Contact: [address] Phone: [phone]  Mobile Phone: [phone]  Relation: Grandchild    Past Surgical History:  Past Surgical History:   Procedure Laterality Date    APPENDECTOMY      CATARACT REMOVAL WITH IMPLANT  11    right eye    CATARACT REMOVAL WITH IMPLANT  t    cataract removal with lens implant left eye    CHOLECYSTECTOMY      COLONOSCOPY      HYSTERECTOMY      TONSILLECTOMY      age 15       Immunization History: There is no immunization history on file for this patient. Active Problems:  Patient Active Problem List   Diagnosis Code    PAF (paroxysmal atrial fibrillation) ([facility] I48.0    Falls W19. 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    Syncope and collapse R55    Orthostatic hypotension I95.1       Isolation/Infection:   Isolation          Droplet Plus        Patient Infection Status     Infection Onset Added Last Indicated Last Indicated By Review Planned Expiration Resolved Resolved By    COVID-19 21 COVID-19 21      C-diff Rule Out 21 GI Bacterial Pathogens By PCR (Ordered)        Resolved    COVID-19 Rule Out 21 COVID-19 (Ordered)   21 Rule-Out Test Resulted    COVID-19 Rule Out 21 COVID-19 (Ordered)   21 Rule-Out Test Resulted    COVID-19 Rule Out 21 COVID-19 (Ordered)   21 Rule-Out Test Resulted    COVID-19 Rule Out 21 COVID-19 (Ordered)   21 Rule-Out Test Resulted          Nurse Assessment:  Last Vital Signs: BP (!) 162/90   Pulse 88   Temp 97.5 °F (36.4 °C) (Oral)   Resp 20   Wt 114 lb (51.7 kg)   SpO2 96%   BMI 18.97 kg/m²     Last documented pain score (0-10 scale):    Last Weight:   Wt Readings from Last 1 Encounters:   21 114 lb (51.7 kg)     Mental Status:  alert    IV Access:  - None    Nursing Mobility/ADLs:  Walking   Assisted  Transfer  {CHP DME TFV}  Bathing  {CHP DME ARJM:966382199}  Dressing  {CHP DME ADMO:654119137}  Toileting  {CHP DME VJFW:580917476}  Feeding  {CHP DME RATA:504507576}  Med Admin  {CHP DME WDQY:522843902}  Med Delivery   {[facility] [place] Delivery:455621610}    Wound Care Documentation and Therapy:        Elimination:  Continence:   · Bowel: {YES / RW:11589}  · Bladder: {YES / T}  Urinary Catheter: {Urinary Catheter:592320280}   Colostomy/Ileostomy/Ileal Conduit: {YES / LO:48438}       Date of Last BM: ***    Intake/Output Summary (Last 24 hours) at 2021 1135  Last data filed at 2021 0524  Gross per 24 hour   Intake 1047 ml   Output --   Net 1047 ml     I/O last 3 completed shifts:   In: 0992 [P.O.:60; I.V.:487; IV Piggyback:500]  Out: -     Safety Concerns:     [address] KIKE Safety Concerns:310996903}    Impairments/Disabilities:      [address] KIKE Impairments/Disabilities:160840481}    Nutrition Therapy:  Current Nutrition Therapy:   [address] KIKE Diet List:277007172}    Routes of Feeding: {CHP DME Other Feedings:771495790}  Liquids: {Slp liquid thickness:21271}  Daily Fluid Restriction: {CHP DME Yes amt example:617079229}  Last Modified Barium Swallow with Video (Video Swallowing Test): {Done Not Done ZEOO:353909784}    Treatments at the Time of Hospital Discharge:   Respiratory Treatments: ***  Oxygen Therapy:  {Therapy; copd oxygen:00983}  Ventilator:    {MH CC Vent CKGB:211247118}    Rehab Therapies: {THERAPEUTIC INTERVENTION:5372900404}  Weight Bearing Status/Restrictions: [facility] CC Weight Bearin}  Other Medical Equipment (for information only, NOT a DME order):  {EQUIPMENT:559573773}  Other Treatments: ***    Patient's personal belongings (please select all that are sent with patient):  {CHP DME Belongings:148912393}    RN SIGNATURE:  {Esignature:057745187}    CASE MANAGEMENT/SOCIAL WORK SECTION    Inpatient Status Date: 2021    Readmission Risk Assessment Score:  Readmission Risk              Risk of Unplanned Readmission:        0         Discharging to Facility/ Agency   · Name: [facility]   · Address:  · Phone:[phone]  · Fax:[phone]    / signature: Electronically signed by ED Cortez on 21 at 1:12 PM EST    PHYSICIAN SECTION    Prognosis: Fair    Condition at Discharge: Stable    Rehab Potential (if transferring to Rehab): Good    Recommended Labs or Other Treatments After Discharge:      Physician Certification: I certify the above information and transfer of Yue Graham  is necessary for the continuing treatment of the diagnosis listed and that she requires [facility] for less 30 days.      Update Admission H&P: No change in H&P    PHYSICIAN SIGNATURE:  Electronically signed by Radha Nichols MD on 21 at 11:36 AM EST

## 2021-01-28 NOTE — ED NOTES
Bed: 02  Expected date:   Expected time:   Means of arrival:   Comments:  COLUMBA [address]all[address], RN  01/27/21 2135

## 2021-01-28 NOTE — ED NOTES
Spoke with patient's daughter and updated her on plan of care. Patients daughter is on the phone with patient at this time.       Horace Bolton RN  01/28/21 5268

## 2021-01-28 NOTE — ED NOTES
Pt at CT at this time. Fall risk screening completed. Fall risk bracelet applied to patient. Non-skid socks provided and placed on patient. The fall risk is indicated using  dome light . Based on score, a bed alarm was indicated and applied. The call light is within the patient's reach, and instructions for use were provided. The bed is in the lowest position with wheels locked. The patient has been advised to notify staff, using the call light, if there is a need to get up or use restroom. The patient verbalized understanding of safety precautions and how to contact staff for assistance.        Evert Colvin RN  01/27/21 3563

## 2021-01-28 NOTE — ED NOTES
Patient dressed and ready for discharge. Patient given a bag of chips per her request with a cup of ice water. Will wait for transport. Call light remains in reach.       Donovan Card RN  01/28/21 6333

## 2021-01-29 LAB — GI BACTERIAL PATHOGENS BY PCR: NORMAL

## 2021-03-22 ENCOUNTER — TELEPHONE (OUTPATIENT)
Dept: CASE MANAGEMENT | Age: 86
End: 2021-03-22

## 2021-03-24 NOTE — CARE COORDINATION
CASE MANAGEMENT DISCHARGE SUMMARY      Discharge to: [facility]    Precertification completed: yes    Hospital Exemption Notification (HENS) completed: yes     Transportation:        Medical Transport explained to pt/family. Pt/family voice no agency preference. [facility]   Ambulance form completed: Yes    Confirmed discharge plan with:     Patient: yes      Family, name and contact number:  Ewa Fall [phone]     Facility/Agency, name:  [facility] faxed   Phone number for report to facility:  [phone]     RN, name: Yady Cifuentes    Note: Discharging nurse to complete [facility], reconcile [facility], and place final copy with patient's discharge packet. RN to ensure that written prescriptions for  Level II medications are sent with patient to the facility as per protocol.     Gm Gonzalez RN
CASE MANAGEMENT INITIAL ASSESSMENT    Reviewed chart and completed assessment via telephone with: Patient dtr/MELANY Headley  Explained Case Management role/services. Primary contact information:Adry Wynn     Health Care Decision Maker :   Primary Decision Maker: Mari Dumas - [phone]          Can this person be reached and be able to respond quickly, such as within a few minutes or hours? Yes    Admit date/status: 01/04/2021 Observation   Diagnosis: Syncope and collapse  Is this a Readmission?:  No      Insurance:Medicare   Precert required for SNF: No       3 night stay required: No    Living arrangements, Adls, care needs, prior to admission: Patient living at [facility], [address] [place] [address] with 5JT    Transportation: Family support, may need assist at [place] re broken ribs family feels may not be able to have transfer into [address] at home:  Walker_x_Cane__RTS__ BSC__Shower Chair_x_  02__ HHN__ CPAP__  BiPap__  Hospital Bed__ W/C___ Other__________    Services in the home and/or outpatient, prior to admission: Prior [place] [address] unable to recall provider, Open to SNF at [facility] if recs     PT/OT recs: Will follow for [facility] Exemption Notification (HEN):needed for SNF    Barriers to discharge: denies     Plan/comments: Patient plans to return home to [address] [place], [place] [address]ment open to [place] [address] or SNF if needed. Patient ambulates with 6MN prior to hospitalization , dtr had encouraged Patient to use pull string in apartment for ADLS re discomfort from fall. Per dtr AL is able to assist with some ADLS and would help with shower once per week.  ED Chavez      ECOC on chart for MD signature
PLans for discharge to [facility] later today. Patient remains orthostatic. Consult ordered for Cardiology. Notified Nadeem Lee (liaison) [facility] of delay in discharge. Will follow.     Rock Bereket RN
PT/OT recommending SNF. Writer [facility], will await response. JONATHAN Burton-RN     1249 Addendum:  Writer spoke with pt at bedside, she states she does not need rehab since she is limited by pain; however, she will consider going tomorrow. Writer spoke with Ana/[facility], they can accept pt. Medicare is still waiving 3night stay. CM will need to complete PAS online day of discharge. [facility] with rapid Covid within 72hrs, done most recently 1/4. Writer spoke with Dr Norma Kimball, likely discharge tomorrow.   FRANSISCO Burton
No
